# Patient Record
Sex: FEMALE | Race: WHITE
[De-identification: names, ages, dates, MRNs, and addresses within clinical notes are randomized per-mention and may not be internally consistent; named-entity substitution may affect disease eponyms.]

---

## 2017-05-12 PROBLEM — Z00.00 ENCOUNTER FOR PREVENTIVE HEALTH EXAMINATION: Status: ACTIVE | Noted: 2017-05-12

## 2017-05-30 ENCOUNTER — RECORD ABSTRACTING (OUTPATIENT)
Age: 77
End: 2017-05-30

## 2017-05-30 ENCOUNTER — RESULT CHARGE (OUTPATIENT)
Age: 77
End: 2017-05-30

## 2017-05-30 DIAGNOSIS — H40.9 UNSPECIFIED GLAUCOMA: ICD-10-CM

## 2017-05-30 DIAGNOSIS — H35.30 UNSPECIFIED MACULAR DEGENERATION: ICD-10-CM

## 2017-05-30 DIAGNOSIS — H26.9 UNSPECIFIED CATARACT: ICD-10-CM

## 2017-05-31 ENCOUNTER — NON-APPOINTMENT (OUTPATIENT)
Age: 77
End: 2017-05-31

## 2017-05-31 ENCOUNTER — OTHER (OUTPATIENT)
Age: 77
End: 2017-05-31

## 2017-05-31 ENCOUNTER — APPOINTMENT (OUTPATIENT)
Dept: INTERNAL MEDICINE | Facility: CLINIC | Age: 77
End: 2017-05-31
Payer: MEDICARE

## 2017-05-31 VITALS
TEMPERATURE: 98.1 F | WEIGHT: 154 LBS | BODY MASS INDEX: 24.75 KG/M2 | HEIGHT: 66 IN | HEART RATE: 84 BPM | OXYGEN SATURATION: 96 % | RESPIRATION RATE: 16 BRPM | SYSTOLIC BLOOD PRESSURE: 134 MMHG | DIASTOLIC BLOOD PRESSURE: 80 MMHG

## 2017-05-31 DIAGNOSIS — H40.2230 CHRONIC ANGLE-CLOSURE GLAUCOMA, BILATERAL, STAGE UNSPECIFIED: ICD-10-CM

## 2017-05-31 DIAGNOSIS — Z78.9 OTHER SPECIFIED HEALTH STATUS: ICD-10-CM

## 2017-05-31 DIAGNOSIS — H43.813 VITREOUS DEGENERATION, BILATERAL: ICD-10-CM

## 2017-05-31 DIAGNOSIS — M25.474 EFFUSION, RIGHT FOOT: ICD-10-CM

## 2017-05-31 DIAGNOSIS — R63.5 ABNORMAL WEIGHT GAIN: ICD-10-CM

## 2017-05-31 DIAGNOSIS — Z80.9 FAMILY HISTORY OF MALIGNANT NEOPLASM, UNSPECIFIED: ICD-10-CM

## 2017-05-31 DIAGNOSIS — Z84.1 FAMILY HISTORY OF DISORDERS OF KIDNEY AND URETER: ICD-10-CM

## 2017-05-31 DIAGNOSIS — Z81.8 FAMILY HISTORY OF OTHER MENTAL AND BEHAVIORAL DISORDERS: ICD-10-CM

## 2017-05-31 DIAGNOSIS — Z82.49 FAMILY HISTORY OF ISCHEMIC HEART DISEASE AND OTHER DISEASES OF THE CIRCULATORY SYSTEM: ICD-10-CM

## 2017-05-31 DIAGNOSIS — E78.5 HYPERLIPIDEMIA, UNSPECIFIED: ICD-10-CM

## 2017-05-31 PROCEDURE — 94060 EVALUATION OF WHEEZING: CPT

## 2017-05-31 PROCEDURE — 94727 GAS DIL/WSHOT DETER LNG VOL: CPT

## 2017-05-31 PROCEDURE — 99215 OFFICE O/P EST HI 40 MIN: CPT | Mod: 25

## 2017-05-31 PROCEDURE — 93000 ELECTROCARDIOGRAM COMPLETE: CPT

## 2017-05-31 PROCEDURE — 94729 DIFFUSING CAPACITY: CPT

## 2017-06-23 LAB
CHOLEST SERPL-MCNC: 169
HDLC SERPL-MCNC: 74
LDLC SERPL CALC-MCNC: 80

## 2017-07-22 ENCOUNTER — EMERGENCY (EMERGENCY)
Facility: HOSPITAL | Age: 77
LOS: 0 days | Discharge: HOME | End: 2017-07-22
Attending: EMERGENCY MEDICINE | Admitting: EMERGENCY MEDICINE
Payer: MEDICARE

## 2017-07-22 VITALS
OXYGEN SATURATION: 100 % | TEMPERATURE: 98 F | HEIGHT: 67 IN | DIASTOLIC BLOOD PRESSURE: 93 MMHG | RESPIRATION RATE: 18 BRPM | SYSTOLIC BLOOD PRESSURE: 168 MMHG | WEIGHT: 160.06 LBS | HEART RATE: 73 BPM
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
## 2017-07-22 DIAGNOSIS — S90.02XA CONTUSION OF LEFT ANKLE, INITIAL ENCOUNTER: ICD-10-CM

## 2017-07-22 PROCEDURE — 29505 APPLICATION LONG LEG SPLINT: CPT

## 2017-07-22 PROCEDURE — 73590 X-RAY EXAM OF LOWER LEG: CPT | Mod: 26,LT

## 2017-07-22 PROCEDURE — 99284 EMERGENCY DEPT VISIT MOD MDM: CPT | Mod: 25

## 2017-07-22 PROCEDURE — 73610 X-RAY EXAM OF ANKLE: CPT | Mod: 26,RT

## 2017-07-22 PROCEDURE — 73630 X-RAY EXAM OF FOOT: CPT | Mod: 26,LT

## 2017-07-22 RX ORDER — IBUPROFEN 200 MG
600 TABLET ORAL ONCE
Qty: 0 | Refills: 0 | Status: COMPLETED | OUTPATIENT
Start: 2017-07-22 | End: 2017-07-22

## 2017-07-22 RX ADMIN — Medication 600 MILLIGRAM(S): at 17:08

## 2017-07-22 NOTE — ED PROVIDER NOTE - MUSCULOSKELETAL, MLM
Spine appears normal. LLE: +mild left foot swelling and ecchymosis over the dorsal aspect over digits 2-4. +Tenderness to posterior aspect of left lateral malleolus. Limited ROM secondary to pain. Pulses and sensation intact. Spine appears normal. LLE: +mild left foot swelling and ecchymosis over the proximal dorsal aspect over digits 2-4. +Tenderness to posterior aspect of left lateral malleolus. Limited ROM secondary to pain. Pulses and sensation intact.

## 2017-07-22 NOTE — ED PROVIDER NOTE - OBJECTIVE STATEMENT
77 y/o female with no PMHx presents to the ED BIBEMS s/p fall PTA. Pt was carrying packages down the stairs, slipped on a step, her left knee bent and fell over 3 steps onto her left ankle. Pt was ambulatory right after. No LOC. Did not hit her head. C/o left ankle pain and swelling. Denies lightheadedness, dizziness, CP, or SOB prior to event. No daily meds. Did not take pain meds PTA. Notes chronic mild pedal edema, is seeing a doctor outpatient and wears compression stocking. Otherwise no other complaints.

## 2017-07-22 NOTE — ED PROCEDURE NOTE - CPROC ED POST PROC CARE GUIDE1
Instructed patient/caregiver regarding signs and symptoms of infection./Elevate the injured extremity as instructed./Keep the cast/splint/dressing clean and dry./Instructed patient/caregiver to follow-up with primary care physician./Verbal/written post procedure instructions were given to patient/caregiver.

## 2017-07-22 NOTE — ED PROVIDER NOTE - MEDICAL DECISION MAKING DETAILS
XR left foot, ankle and lower leg. Reassess. XR left foot, ankle and lower leg. Reassess.  XRs show no obvious fracture. Placed in posterior mold splint, ortho f/u

## 2017-07-22 NOTE — ED PROVIDER NOTE - PROGRESS NOTE DETAILS
No obvious fracture on XRays. Will splint in posterior mold and provide crutches for possible midfoot fracture, pt instructed to f/u with orthopedics or her own podiatrist

## 2017-07-22 NOTE — ED ADULT NURSE NOTE - OBJECTIVE STATEMENT
s/p fall down 3 steps, twisting left foot/ankle, denies syncope/dizziness. Left foot pain worse with movement and flexion, denies knee or hip pain. Notable bruising to top of left foot.

## 2017-07-25 ENCOUNTER — OUTPATIENT (OUTPATIENT)
Dept: OUTPATIENT SERVICES | Facility: HOSPITAL | Age: 77
LOS: 1 days | End: 2017-07-25
Payer: MEDICARE

## 2017-07-25 ENCOUNTER — APPOINTMENT (OUTPATIENT)
Dept: CT IMAGING | Facility: CLINIC | Age: 77
End: 2017-07-25

## 2017-07-25 DIAGNOSIS — Z00.8 ENCOUNTER FOR OTHER GENERAL EXAMINATION: ICD-10-CM

## 2017-07-25 PROCEDURE — 76377 3D RENDER W/INTRP POSTPROCES: CPT

## 2017-07-25 PROCEDURE — 73700 CT LOWER EXTREMITY W/O DYE: CPT

## 2018-01-08 ENCOUNTER — RECORD ABSTRACTING (OUTPATIENT)
Age: 78
End: 2018-01-08

## 2018-01-08 RX ORDER — UBIDECARENONE 200 MG
CAPSULE ORAL
Refills: 0 | Status: DISCONTINUED | COMMUNITY
End: 2018-01-08

## 2018-05-02 ENCOUNTER — APPOINTMENT (OUTPATIENT)
Dept: INTERNAL MEDICINE | Facility: CLINIC | Age: 78
End: 2018-05-02

## 2018-05-02 DIAGNOSIS — R00.2 PALPITATIONS: ICD-10-CM

## 2018-05-02 DIAGNOSIS — M79.671 PAIN IN RIGHT FOOT: ICD-10-CM

## 2018-05-02 RX ORDER — CALCIUM CARBONATE/VITAMIN D3 600 MG-10
TABLET ORAL
Refills: 0 | Status: ACTIVE | COMMUNITY

## 2018-05-02 RX ORDER — TIMOLOL MALEATE 5 MG/ML
0.5 SOLUTION OPHTHALMIC
Qty: 15 | Refills: 0 | Status: ACTIVE | COMMUNITY
Start: 2018-02-10

## 2018-05-02 RX ORDER — UBIDECARENONE 200 MG
200 CAPSULE ORAL
Refills: 0 | Status: ACTIVE | COMMUNITY

## 2018-05-02 RX ORDER — CLINDAMYCIN HYDROCHLORIDE 300 MG/1
300 CAPSULE ORAL
Qty: 40 | Refills: 0 | Status: COMPLETED | COMMUNITY
Start: 2018-04-19

## 2018-05-02 RX ORDER — LATANOPROST/PF 0.005 %
0.01 DROPS OPHTHALMIC (EYE)
Qty: 8 | Refills: 0 | Status: ACTIVE | COMMUNITY
Start: 2017-03-19

## 2018-05-02 RX ORDER — MULTIVITAMIN
TABLET ORAL
Refills: 0 | Status: ACTIVE | COMMUNITY

## 2018-05-04 ENCOUNTER — APPOINTMENT (OUTPATIENT)
Dept: INTERNAL MEDICINE | Facility: CLINIC | Age: 78
End: 2018-05-04

## 2018-05-04 DIAGNOSIS — J30.2 OTHER ALLERGIC RHINITIS: ICD-10-CM

## 2018-05-04 DIAGNOSIS — J45.998 OTHER ASTHMA: ICD-10-CM

## 2018-05-04 DIAGNOSIS — I10 ESSENTIAL (PRIMARY) HYPERTENSION: ICD-10-CM

## 2018-05-04 DIAGNOSIS — J30.89 OTHER ALLERGIC RHINITIS: ICD-10-CM

## 2018-05-04 DIAGNOSIS — Z91.19 PATIENT'S NONCOMPLIANCE WITH OTHER MEDICAL TREATMENT AND REGIMEN: ICD-10-CM

## 2018-05-04 RX ORDER — BRIMONIDINE TARTRATE 0.2 %
DROPS OPHTHALMIC (EYE)
Refills: 0 | Status: ACTIVE | COMMUNITY

## 2018-05-10 ENCOUNTER — APPOINTMENT (OUTPATIENT)
Dept: INTERNAL MEDICINE | Facility: CLINIC | Age: 78
End: 2018-05-10
Payer: MEDICARE

## 2018-05-10 VITALS
DIASTOLIC BLOOD PRESSURE: 80 MMHG | WEIGHT: 144 LBS | SYSTOLIC BLOOD PRESSURE: 140 MMHG | RESPIRATION RATE: 16 BRPM | TEMPERATURE: 98.6 F | BODY MASS INDEX: 23.14 KG/M2 | HEIGHT: 66 IN | HEART RATE: 72 BPM | OXYGEN SATURATION: 98 %

## 2018-05-10 DIAGNOSIS — Z86.69 PERSONAL HISTORY OF OTHER DISEASES OF THE NERVOUS SYSTEM AND SENSE ORGANS: ICD-10-CM

## 2018-05-10 PROCEDURE — 99213 OFFICE O/P EST LOW 20 MIN: CPT

## 2018-05-10 RX ORDER — VITAMIN K2 90 MCG
400 CAPSULE ORAL
Refills: 0 | Status: DISCONTINUED | COMMUNITY
End: 2018-05-10

## 2018-05-10 RX ORDER — CHOLECALCIFEROL (VITAMIN D3) 50 MCG
50 MCG TABLET ORAL
Refills: 0 | Status: DISCONTINUED | COMMUNITY
End: 2018-05-10

## 2018-05-14 ENCOUNTER — APPOINTMENT (OUTPATIENT)
Dept: INTERNAL MEDICINE | Facility: CLINIC | Age: 78
End: 2018-05-14
Payer: MEDICARE

## 2018-05-14 VITALS
HEIGHT: 66 IN | WEIGHT: 141 LBS | RESPIRATION RATE: 20 BRPM | SYSTOLIC BLOOD PRESSURE: 124 MMHG | HEART RATE: 68 BPM | TEMPERATURE: 97.4 F | BODY MASS INDEX: 22.66 KG/M2 | OXYGEN SATURATION: 98 % | DIASTOLIC BLOOD PRESSURE: 88 MMHG

## 2018-05-14 DIAGNOSIS — R41.3 OTHER AMNESIA: ICD-10-CM

## 2018-05-14 DIAGNOSIS — R19.7 DIARRHEA, UNSPECIFIED: ICD-10-CM

## 2018-05-14 PROCEDURE — 99213 OFFICE O/P EST LOW 20 MIN: CPT

## 2018-10-22 NOTE — ED ADULT NURSE NOTE - ALCOHOL PRE SCREEN (AUDIT - C)
EXAM: AP and lateral views of the right knee

 

DATE: 10/22/2018 9:28 AM

 

INDICATION: POST OP RIGHT TOTAL KNEE REPLACEMENT

 

COMPARISON: No Prior

 

FINDINGS:

Postoperative changes of right total knee arthroplasty are now seen, in 

good alignment without definite hardware complication. Expected 

postoperative soft tissue changes are seen. No evidence of acute fracture 

or dislocation.

 

IMPRESSION:

1.  Postoperative changes of right total knee arthroplasty, in good 

alignment without definite hardware complication or fracture.

 

Electronically signed by: Humphrey Donovan MD (10/22/2018 10:07 AM) [facility]
Statement Selected

## 2019-04-18 ENCOUNTER — OUTPATIENT (OUTPATIENT)
Dept: OUTPATIENT SERVICES | Facility: HOSPITAL | Age: 79
LOS: 1 days | End: 2019-04-18
Payer: MEDICARE

## 2019-04-18 ENCOUNTER — APPOINTMENT (OUTPATIENT)
Dept: MRI IMAGING | Facility: CLINIC | Age: 79
End: 2019-04-18
Payer: MEDICARE

## 2019-04-18 DIAGNOSIS — Z00.8 ENCOUNTER FOR OTHER GENERAL EXAMINATION: ICD-10-CM

## 2019-04-18 PROCEDURE — 70551 MRI BRAIN STEM W/O DYE: CPT | Mod: 26

## 2019-04-18 PROCEDURE — 70551 MRI BRAIN STEM W/O DYE: CPT

## 2019-06-06 ENCOUNTER — APPOINTMENT (OUTPATIENT)
Dept: INTERNAL MEDICINE | Facility: CLINIC | Age: 79
End: 2019-06-06

## 2020-03-01 ENCOUNTER — EMERGENCY (EMERGENCY)
Facility: HOSPITAL | Age: 80
LOS: 0 days | Discharge: HOME | End: 2020-03-01
Attending: EMERGENCY MEDICINE
Payer: MEDICARE

## 2020-03-01 VITALS
TEMPERATURE: 98 F | HEART RATE: 61 BPM | WEIGHT: 119.93 LBS | HEIGHT: 66 IN | SYSTOLIC BLOOD PRESSURE: 167 MMHG | RESPIRATION RATE: 17 BRPM | OXYGEN SATURATION: 100 % | DIASTOLIC BLOOD PRESSURE: 80 MMHG

## 2020-03-01 VITALS — DIASTOLIC BLOOD PRESSURE: 82 MMHG | HEART RATE: 60 BPM | SYSTOLIC BLOOD PRESSURE: 161 MMHG

## 2020-03-01 DIAGNOSIS — F03.90 UNSPECIFIED DEMENTIA WITHOUT BEHAVIORAL DISTURBANCE: ICD-10-CM

## 2020-03-01 DIAGNOSIS — R42 DIZZINESS AND GIDDINESS: ICD-10-CM

## 2020-03-01 LAB
ALBUMIN SERPL ELPH-MCNC: 3.4 G/DL — SIGNIFICANT CHANGE UP (ref 3.3–5)
ALP SERPL-CCNC: 98 U/L — SIGNIFICANT CHANGE UP (ref 40–120)
ALT FLD-CCNC: 19 U/L — SIGNIFICANT CHANGE UP (ref 12–78)
ANION GAP SERPL CALC-SCNC: 4 MMOL/L — LOW (ref 5–17)
AST SERPL-CCNC: 23 U/L — SIGNIFICANT CHANGE UP (ref 15–37)
BASOPHILS # BLD AUTO: 0.21 K/UL — HIGH (ref 0–0.2)
BASOPHILS NFR BLD AUTO: 2.5 % — HIGH (ref 0–2)
BILIRUB SERPL-MCNC: 0.7 MG/DL — SIGNIFICANT CHANGE UP (ref 0.2–1.2)
BUN SERPL-MCNC: 15 MG/DL — SIGNIFICANT CHANGE UP (ref 7–23)
CALCIUM SERPL-MCNC: 8.8 MG/DL — SIGNIFICANT CHANGE UP (ref 8.5–10.1)
CHLORIDE SERPL-SCNC: 110 MMOL/L — HIGH (ref 96–108)
CO2 SERPL-SCNC: 28 MMOL/L — SIGNIFICANT CHANGE UP (ref 22–31)
CREAT SERPL-MCNC: 0.83 MG/DL — SIGNIFICANT CHANGE UP (ref 0.5–1.3)
EOSINOPHIL # BLD AUTO: 0.45 K/UL — SIGNIFICANT CHANGE UP (ref 0–0.5)
EOSINOPHIL NFR BLD AUTO: 5.3 % — SIGNIFICANT CHANGE UP (ref 0–6)
GLUCOSE SERPL-MCNC: 89 MG/DL — SIGNIFICANT CHANGE UP (ref 70–99)
HCT VFR BLD CALC: 41.5 % — SIGNIFICANT CHANGE UP (ref 34.5–45)
HGB BLD-MCNC: 13.5 G/DL — SIGNIFICANT CHANGE UP (ref 11.5–15.5)
IMM GRANULOCYTES NFR BLD AUTO: 0.1 % — SIGNIFICANT CHANGE UP (ref 0–1.5)
LYMPHOCYTES # BLD AUTO: 1.96 K/UL — SIGNIFICANT CHANGE UP (ref 1–3.3)
LYMPHOCYTES # BLD AUTO: 23.2 % — SIGNIFICANT CHANGE UP (ref 13–44)
MCHC RBC-ENTMCNC: 31.4 PG — SIGNIFICANT CHANGE UP (ref 27–34)
MCHC RBC-ENTMCNC: 32.5 GM/DL — SIGNIFICANT CHANGE UP (ref 32–36)
MCV RBC AUTO: 96.5 FL — SIGNIFICANT CHANGE UP (ref 80–100)
MONOCYTES # BLD AUTO: 0.86 K/UL — SIGNIFICANT CHANGE UP (ref 0–0.9)
MONOCYTES NFR BLD AUTO: 10.2 % — SIGNIFICANT CHANGE UP (ref 2–14)
NEUTROPHILS # BLD AUTO: 4.97 K/UL — SIGNIFICANT CHANGE UP (ref 1.8–7.4)
NEUTROPHILS NFR BLD AUTO: 58.7 % — SIGNIFICANT CHANGE UP (ref 43–77)
PLATELET # BLD AUTO: 259 K/UL — SIGNIFICANT CHANGE UP (ref 150–400)
POTASSIUM SERPL-MCNC: 3.8 MMOL/L — SIGNIFICANT CHANGE UP (ref 3.5–5.3)
POTASSIUM SERPL-SCNC: 3.8 MMOL/L — SIGNIFICANT CHANGE UP (ref 3.5–5.3)
PROT SERPL-MCNC: 7 GM/DL — SIGNIFICANT CHANGE UP (ref 6–8.3)
RBC # BLD: 4.3 M/UL — SIGNIFICANT CHANGE UP (ref 3.8–5.2)
RBC # FLD: 12.7 % — SIGNIFICANT CHANGE UP (ref 10.3–14.5)
SODIUM SERPL-SCNC: 142 MMOL/L — SIGNIFICANT CHANGE UP (ref 135–145)
WBC # BLD: 8.46 K/UL — SIGNIFICANT CHANGE UP (ref 3.8–10.5)
WBC # FLD AUTO: 8.46 K/UL — SIGNIFICANT CHANGE UP (ref 3.8–10.5)

## 2020-03-01 PROCEDURE — 99284 EMERGENCY DEPT VISIT MOD MDM: CPT

## 2020-03-01 PROCEDURE — 36415 COLL VENOUS BLD VENIPUNCTURE: CPT

## 2020-03-01 PROCEDURE — 80053 COMPREHEN METABOLIC PANEL: CPT

## 2020-03-01 PROCEDURE — 85025 COMPLETE CBC W/AUTO DIFF WBC: CPT

## 2020-03-01 PROCEDURE — 93010 ELECTROCARDIOGRAM REPORT: CPT

## 2020-03-01 PROCEDURE — 99284 EMERGENCY DEPT VISIT MOD MDM: CPT | Mod: 25

## 2020-03-01 PROCEDURE — 70450 CT HEAD/BRAIN W/O DYE: CPT | Mod: 26

## 2020-03-01 PROCEDURE — 93005 ELECTROCARDIOGRAM TRACING: CPT

## 2020-03-01 PROCEDURE — 70450 CT HEAD/BRAIN W/O DYE: CPT

## 2020-03-01 RX ORDER — SODIUM CHLORIDE 9 MG/ML
1000 INJECTION INTRAMUSCULAR; INTRAVENOUS; SUBCUTANEOUS ONCE
Refills: 0 | Status: COMPLETED | OUTPATIENT
Start: 2020-03-01 | End: 2020-03-01

## 2020-03-01 RX ADMIN — SODIUM CHLORIDE 1000 MILLILITER(S): 9 INJECTION INTRAMUSCULAR; INTRAVENOUS; SUBCUTANEOUS at 11:10

## 2020-03-01 NOTE — ED STATDOCS - ENMT, MLM
Nasal mucosa clear.  Mouth with normal mucosa  Throat has no vesicles,  and uvula is midline. Oropharynx is dry

## 2020-03-01 NOTE — ED STATDOCS - PROGRESS NOTE DETAILS
80 yo female with a PMH of dementia, not on any meds per aid, presents with a 78 yo female with a PMH of dementia, not on any meds per aid, presents with a feelign of dizziness that occurred today. Pt was walking and felt like she was going to pass out. Per aid, this was the 2nd episode that happened. EMS was called and checked her BP which was 170/100 and advised to go to the ER. Pt currently not dizzy. Denies cp, sob, abd pain, n/v/d. Pt states she is eating an drinking normally. CT head due to this dizziness episode, labs, IVF. Reeval. -Dennys Quigley PA-C 80 yo female with a PMH of dementia, not on any meds per aid, presents with a feeling of dizziness that occurred today. Pt was walking and felt like she was going to pass out. Per aid, this was the 2nd episode that happened. EMS was called and checked her BP which was 170/100 and advised to go to the ER. Pt currently not dizzy. Denies cp, sob, abd pain, n/v/d. Pt states she is eating an drinking normally. CT head due to this dizziness episode, labs, IVF. Reeval. -Dennys Quigley PA-C Pt feeling better. Discussed with pt and aid the results of the unremarkable imaging and lab tests. Pt wants to go home. Advised pt to f/u with pmd and will give neuro f/u -Dennys Quigley PA-C

## 2020-03-01 NOTE — ED STATDOCS - CLINICAL SUMMARY MEDICAL DECISION MAKING FREE TEXT BOX
Elderly female with dementia presents with near syncopal episode. Plan: Will rehydrate and EKG. CT scan of brain as pt is poor historian.

## 2020-03-01 NOTE — ED STATDOCS - CARE PROVIDER_API CALL
Patsy Cool (MD)  Clinical Neurophysiology; EEGEpilepsy; Neurology; Sleep Medicine  [address] [facility] [address], [place] [address]  Phone: [phone]  Fax: [phone]  Follow Up Time:

## 2020-03-01 NOTE — ED STATDOCS - ATTENDING CONTRIBUTION TO CARE
I, Merly Lopez MD, personally saw the patient with ACP.  I have personally performed a face to face diagnostic evaluation on this patient.  I have reviewed the ACP note and agree with the history, exam, and plan of care, except as noted.

## 2020-03-01 NOTE — ED ADULT NURSE NOTE - OBJECTIVE STATEMENT
Pt arrived with her aid.pt is confused  h/o dementia according to aid pt c/o dizziness at home. pt is able to walk on her own. no c/o pain. pt live at home alone with 4 hr with aid a day. no information about pmd or medication. Pt arrived with her aid.pt is confused  h/o dementia according to aid pt c/o dizziness at home. pt is able to walk on her own. no c/o pain. pt live at home alone with 4 hr with aid a day. no information about pmd , medication or health care proxy.

## 2020-03-08 ENCOUNTER — EMERGENCY (EMERGENCY)
Facility: HOSPITAL | Age: 80
LOS: 0 days | Discharge: HOME | End: 2020-03-09
Attending: FAMILY MEDICINE
Payer: MEDICARE

## 2020-03-08 VITALS
HEART RATE: 66 BPM | TEMPERATURE: 98 F | WEIGHT: 119.93 LBS | DIASTOLIC BLOOD PRESSURE: 86 MMHG | SYSTOLIC BLOOD PRESSURE: 147 MMHG | HEIGHT: 66 IN | OXYGEN SATURATION: 100 % | RESPIRATION RATE: 16 BRPM

## 2020-03-08 DIAGNOSIS — R19.7 DIARRHEA, UNSPECIFIED: ICD-10-CM

## 2020-03-08 DIAGNOSIS — R10.9 UNSPECIFIED ABDOMINAL PAIN: ICD-10-CM

## 2020-03-08 DIAGNOSIS — R11.2 NAUSEA WITH VOMITING, UNSPECIFIED: ICD-10-CM

## 2020-03-08 DIAGNOSIS — F03.90 UNSPECIFIED DEMENTIA WITHOUT BEHAVIORAL DISTURBANCE: ICD-10-CM

## 2020-03-08 LAB
ALBUMIN SERPL ELPH-MCNC: 3.4 G/DL — SIGNIFICANT CHANGE UP (ref 3.3–5)
ALP SERPL-CCNC: 99 U/L — SIGNIFICANT CHANGE UP (ref 40–120)
ALT FLD-CCNC: 23 U/L — SIGNIFICANT CHANGE UP (ref 12–78)
AMMONIA BLD-MCNC: <10 UMOL/L — LOW (ref 11–32)
ANION GAP SERPL CALC-SCNC: 7 MMOL/L — SIGNIFICANT CHANGE UP (ref 5–17)
APPEARANCE UR: CLEAR — SIGNIFICANT CHANGE UP
AST SERPL-CCNC: 38 U/L — HIGH (ref 15–37)
BASOPHILS # BLD AUTO: 0.07 K/UL — SIGNIFICANT CHANGE UP (ref 0–0.2)
BASOPHILS NFR BLD AUTO: 0.5 % — SIGNIFICANT CHANGE UP (ref 0–2)
BILIRUB SERPL-MCNC: 0.9 MG/DL — SIGNIFICANT CHANGE UP (ref 0.2–1.2)
BILIRUB UR-MCNC: NEGATIVE — SIGNIFICANT CHANGE UP
BUN SERPL-MCNC: 13 MG/DL — SIGNIFICANT CHANGE UP (ref 7–23)
CALCIUM SERPL-MCNC: 8.8 MG/DL — SIGNIFICANT CHANGE UP (ref 8.5–10.1)
CHLORIDE SERPL-SCNC: 114 MMOL/L — HIGH (ref 96–108)
CO2 SERPL-SCNC: 23 MMOL/L — SIGNIFICANT CHANGE UP (ref 22–31)
COLOR SPEC: YELLOW — SIGNIFICANT CHANGE UP
CREAT SERPL-MCNC: 0.9 MG/DL — SIGNIFICANT CHANGE UP (ref 0.5–1.3)
DIFF PNL FLD: ABNORMAL
EOSINOPHIL # BLD AUTO: 0.05 K/UL — SIGNIFICANT CHANGE UP (ref 0–0.5)
EOSINOPHIL NFR BLD AUTO: 0.4 % — SIGNIFICANT CHANGE UP (ref 0–6)
GLUCOSE SERPL-MCNC: 98 MG/DL — SIGNIFICANT CHANGE UP (ref 70–99)
GLUCOSE UR QL: NEGATIVE MG/DL — SIGNIFICANT CHANGE UP
HCT VFR BLD CALC: 40.4 % — SIGNIFICANT CHANGE UP (ref 34.5–45)
HGB BLD-MCNC: 13.3 G/DL — SIGNIFICANT CHANGE UP (ref 11.5–15.5)
IMM GRANULOCYTES NFR BLD AUTO: 0.4 % — SIGNIFICANT CHANGE UP (ref 0–1.5)
KETONES UR-MCNC: ABNORMAL
LEUKOCYTE ESTERASE UR-ACNC: NEGATIVE — SIGNIFICANT CHANGE UP
LIDOCAIN IGE QN: 100 U/L — SIGNIFICANT CHANGE UP (ref 73–393)
LYMPHOCYTES # BLD AUTO: 0.44 K/UL — LOW (ref 1–3.3)
LYMPHOCYTES # BLD AUTO: 3.3 % — LOW (ref 13–44)
MAGNESIUM SERPL-MCNC: 2 MG/DL — SIGNIFICANT CHANGE UP (ref 1.6–2.6)
MCHC RBC-ENTMCNC: 31.4 PG — SIGNIFICANT CHANGE UP (ref 27–34)
MCHC RBC-ENTMCNC: 32.9 GM/DL — SIGNIFICANT CHANGE UP (ref 32–36)
MCV RBC AUTO: 95.5 FL — SIGNIFICANT CHANGE UP (ref 80–100)
MONOCYTES # BLD AUTO: 0.68 K/UL — SIGNIFICANT CHANGE UP (ref 0–0.9)
MONOCYTES NFR BLD AUTO: 5.2 % — SIGNIFICANT CHANGE UP (ref 2–14)
NEUTROPHILS # BLD AUTO: 11.86 K/UL — HIGH (ref 1.8–7.4)
NEUTROPHILS NFR BLD AUTO: 90.2 % — HIGH (ref 43–77)
NITRITE UR-MCNC: NEGATIVE — SIGNIFICANT CHANGE UP
NT-PROBNP SERPL-SCNC: 304 PG/ML — SIGNIFICANT CHANGE UP (ref 0–450)
PH UR: 5 — SIGNIFICANT CHANGE UP (ref 5–8)
PLATELET # BLD AUTO: 219 K/UL — SIGNIFICANT CHANGE UP (ref 150–400)
POTASSIUM SERPL-MCNC: 4.1 MMOL/L — SIGNIFICANT CHANGE UP (ref 3.5–5.3)
POTASSIUM SERPL-SCNC: 4.1 MMOL/L — SIGNIFICANT CHANGE UP (ref 3.5–5.3)
PROT SERPL-MCNC: 6.8 GM/DL — SIGNIFICANT CHANGE UP (ref 6–8.3)
PROT UR-MCNC: 15 MG/DL
RBC # BLD: 4.23 M/UL — SIGNIFICANT CHANGE UP (ref 3.8–5.2)
RBC # FLD: 12.7 % — SIGNIFICANT CHANGE UP (ref 10.3–14.5)
SODIUM SERPL-SCNC: 144 MMOL/L — SIGNIFICANT CHANGE UP (ref 135–145)
SP GR SPEC: 1.01 — SIGNIFICANT CHANGE UP (ref 1.01–1.02)
TROPONIN I SERPL-MCNC: <0.015 NG/ML — SIGNIFICANT CHANGE UP (ref 0.01–0.04)
UROBILINOGEN FLD QL: NEGATIVE MG/DL — SIGNIFICANT CHANGE UP
WBC # BLD: 13.15 K/UL — HIGH (ref 3.8–10.5)
WBC # FLD AUTO: 13.15 K/UL — HIGH (ref 3.8–10.5)

## 2020-03-08 PROCEDURE — 74178 CT ABD&PLV WO CNTR FLWD CNTR: CPT

## 2020-03-08 PROCEDURE — 83735 ASSAY OF MAGNESIUM: CPT

## 2020-03-08 PROCEDURE — 87086 URINE CULTURE/COLONY COUNT: CPT

## 2020-03-08 PROCEDURE — 93005 ELECTROCARDIOGRAM TRACING: CPT

## 2020-03-08 PROCEDURE — 74177 CT ABD & PELVIS W/CONTRAST: CPT

## 2020-03-08 PROCEDURE — 36415 COLL VENOUS BLD VENIPUNCTURE: CPT

## 2020-03-08 PROCEDURE — 74176 CT ABD & PELVIS W/O CONTRAST: CPT

## 2020-03-08 PROCEDURE — 85025 COMPLETE CBC W/AUTO DIFF WBC: CPT

## 2020-03-08 PROCEDURE — 74176 CT ABD & PELVIS W/O CONTRAST: CPT | Mod: 26,59

## 2020-03-08 PROCEDURE — 99285 EMERGENCY DEPT VISIT HI MDM: CPT | Mod: 25

## 2020-03-08 PROCEDURE — 84484 ASSAY OF TROPONIN QUANT: CPT

## 2020-03-08 PROCEDURE — 82140 ASSAY OF AMMONIA: CPT

## 2020-03-08 PROCEDURE — 83690 ASSAY OF LIPASE: CPT

## 2020-03-08 PROCEDURE — 71045 X-RAY EXAM CHEST 1 VIEW: CPT

## 2020-03-08 PROCEDURE — 81001 URINALYSIS AUTO W/SCOPE: CPT

## 2020-03-08 PROCEDURE — 99285 EMERGENCY DEPT VISIT HI MDM: CPT

## 2020-03-08 PROCEDURE — 74177 CT ABD & PELVIS W/CONTRAST: CPT | Mod: 26

## 2020-03-08 PROCEDURE — 93010 ELECTROCARDIOGRAM REPORT: CPT

## 2020-03-08 PROCEDURE — 80053 COMPREHEN METABOLIC PANEL: CPT

## 2020-03-08 PROCEDURE — 83880 ASSAY OF NATRIURETIC PEPTIDE: CPT

## 2020-03-08 PROCEDURE — 71045 X-RAY EXAM CHEST 1 VIEW: CPT | Mod: 26

## 2020-03-08 RX ORDER — SODIUM CHLORIDE 9 MG/ML
1000 INJECTION INTRAMUSCULAR; INTRAVENOUS; SUBCUTANEOUS ONCE
Refills: 0 | Status: COMPLETED | OUTPATIENT
Start: 2020-03-08 | End: 2020-03-08

## 2020-03-08 RX ADMIN — SODIUM CHLORIDE 1000 MILLILITER(S): 9 INJECTION INTRAMUSCULAR; INTRAVENOUS; SUBCUTANEOUS at 15:04

## 2020-03-08 NOTE — ED PROVIDER NOTE - PROGRESS NOTE DETAILS
Niyah FRIEND for ED attending, Dr. Dangelo : 79 YOF w/ PMHx of dementia presents to the ED BIBA c/o abd pain w/ N/V onset yesterday. Experienced multiple episodes of N/V. Describes emesis as "gold and white" in color. Vomiting for 2 days. Decreased PO intake and lower abd cramping. Denies SOB, CP, diarrhea, fever, chills, urinary sx. Pt is a poor historian due to pt's dementia at baseline. D/w Dr. Guerrero, will repeat CT with PO contrast. - Aleksandr Schreiber PA-C Patient tolerating PO, UA pending. - Aleksandr Schreiber PA-C UA unremarkable, plan for dc. - Aleksandr Schreiber PA-C Unable to reach emergency contact. Patient does not have safe disposition home. Will keep in ED for SW assessment in AM. - Aleksandr Schreiber PA-C Giovany by S/W -- aide will be at her home at 11am.  Will d/c home then

## 2020-03-08 NOTE — CONSULT NOTE ADULT - SUBJECTIVE AND OBJECTIVE BOX
CC:Patient is a 79y old  Female who presents with a chief complaint of     Subjective:  Pt seen and examined at bedside with chaperone. Pt is AAOx2, baseline dementia, pt is comfortable, cooperative, pt in no acute distress; pt is poor historian, history from chart and ED staff. Pt had episodes of emesis, diarrhea since 3/7/20. No reported c/o fever, chills, chest pain, SOB, extremity pain or dysfunction, hemoptysis, hematemesis, hematuria, hematochexia, headache, diplopia, vertigo, dizzyness.     ROS:  nausea, emesis, diarrhea, otherwise limited ROS secondary to dementia    PMH: dementia  PSH: Unknown  No Known Allergies    SH: no reported etoh, tobacco, illicit drug use  FH: unknown    Vital Signs Last 24 Hrs  T(C): 36.7 (08 Mar 2020 13:08), Max: 36.7 (08 Mar 2020 13:08)  T(F): 98.1 (08 Mar 2020 13:08), Max: 98.1 (08 Mar 2020 13:08)  HR: 72 (08 Mar 2020 13:39) (66 - 72)  BP: 131/78 (08 Mar 2020 13:39) (131/78 - 147/86)  BP(mean): --  RR: 17 (08 Mar 2020 13:39) (16 - 17)  SpO2: 100% (08 Mar 2020 13:39) (100% - 100%)    Labs:      CARDIAC MARKERS ( 08 Mar 2020 13:35 )  <0.015 ng/mL / x     / x     / x     / x                                13.3   13.15 )-----------( 219      ( 08 Mar 2020 13:35 )             40.4     CBC Full  -  ( 08 Mar 2020 13:35 )  WBC Count : 13.15 K/uL  RBC Count : 4.23 M/uL  Hemoglobin : 13.3 g/dL  Hematocrit : 40.4 %  Platelet Count - Automated : 219 K/uL  Mean Cell Volume : 95.5 fl  Mean Cell Hemoglobin : 31.4 pg  Mean Cell Hemoglobin Concentration : 32.9 gm/dL  Auto Neutrophil # : 11.86 K/uL  Auto Lymphocyte # : 0.44 K/uL  Auto Monocyte # : 0.68 K/uL  Auto Eosinophil # : 0.05 K/uL  Auto Basophil # : 0.07 K/uL  Auto Neutrophil % : 90.2 %  Auto Lymphocyte % : 3.3 %  Auto Monocyte % : 5.2 %  Auto Eosinophil % : 0.4 %  Auto Basophil % : 0.5 %    03-08    144  |  114<H>  |  13  ----------------------------<  98  4.1   |  23  |  0.90    Ca    8.8      08 Mar 2020 13:35  Mg     2.0     03-08    TPro  6.8  /  Alb  3.4  /  TBili  0.9  /  DBili  x   /  AST  38<H>  /  ALT  23  /  AlkPhos  99  03-08    LIVER FUNCTIONS - ( 08 Mar 2020 13:35 )  Alb: 3.4 g/dL / Pro: 6.8 gm/dL / ALK PHOS: 99 U/L / ALT: 23 U/L / AST: 38 U/L / GGT: x                 Meds:      Radiology:  Pending offical repeat ct abd/pelvis with oral contrast, preliminary evaluation demonstrates no bowel obstruction with contrast to transverse colon    < from: CT Abdomen and Pelvis w/ IV Cont (03.08.20 @ 15:04) >  EXAM:  CT ABDOMEN AND PELVIS IC                            PROCEDURE DATE:  03/08/2020          INTERPRETATION:  CLINICAL INFORMATION: Abdominal pain and vomiting    COMPARISON: None    PROCEDURE:   CT of the Abdomen and Pelvis was performed with intravenous contrast.   Intravenous contrast: 90 ml Omnipaque 350. 10 ml discarded.  Oral contrast: None.  Sagittal and coronal reformats were performed.    FINDINGS:    LOWER CHEST: Minimal scarring and to linear atelectatic changes in the left lung base. No pleural pericardial effusion.    LIVER: Within normal limits.  BILE DUCTS: Normal caliber.  GALLBLADDER: Within normal limits.  SPLEEN: Within normal limits.  PANCREAS: Within normal limits.  ADRENALS: Within normal limits.  KIDNEYS/URETERS: Subcentimeter, renal hypodensities, too small to characterize. No renal calculus or hydronephrosis.    BLADDER: Within normal limits.  REPRODUCTIVE ORGANS: Uterus and adnexa within normal limits.    BOWEL: There is slightly prominent, fluid-filled loops of small bowel with a suggestion of a zone of transition in the distal ileum the colon is fluid-filled and not distended.. Appendix is not visualized. No evidence of inflammation in the pericecal region.  PERITONEUM: No ascites. No free air in the peritoneal cavity  VESSELS: Within normal limits.  RETROPERITONEUM/LYMPH NODES: No lymphadenopathy.    ABDOMINAL WALL: Within normal limits.  BONES: No destructive lesion in the visualized skeleton. Grade 1 spondylolisthesis of L4 on L5. Mild degenerative changes of the spine.    IMPRESSION:     Slightly prominent, fluid-filled loops of small bowel with questionable zone of transition in the distal small bowel, likely representing mild ileus versus partial small bowel obstruction. Clinical evaluation and/or follow-up examination with oral contrast is recommended as clinically indicated                    SPYROS HARISIADIS   This document has been electronically signed. Mar  8 2020  3:48PM        < end of copied text >      Physical exam:  Pt is AAOx2 with known dementia  Pt in no acute distress  Neuro: CNII-XII grossly intact   Psych: normal affect  HEENT: Normocephalic, atraumatic, CANDIS, EOM wnl  Neck: No crepitus, no ecchymosis, no hematoma, to exam, no JVD, no tracheal deviation  Cardiovascular: S1S2 Present  Respiratory: Respiratory Effort normal; no wheezes, rales or rhonchi to exam, CTAB  ABD: bowel sounds (+), soft, nontender, non distended, no rebound, no guarding, no rigidity, no skin changes to exam. No pelvic instability to exam, no skin changes, negative ross's sign to exam  Musculoskeletal: All digits are warm and well perfused. Pt demonstrates grossly intact sensoromotor function. Pt has good capillary refill to digits, no calf edema or tenderness to exam.  Skin: no jaundice or icteric sclera to exam b/l, no skin changes to exam

## 2020-03-08 NOTE — ED PROVIDER NOTE - PATIENT PORTAL LINK FT
You can access the FollowMyHealth Patient Portal offered by [facility] by registering at the following website: [email or web address]. By joining [employer]’s FollowMyHealth portal, you will also be able to view your health information using other applications (apps) compatible with our system. You can access the FollowMyHealth Patient Portal offered by [facility] by registering at the following website: [email or web address]. By joining [employer]’s FollowMyHealth portal, you will also be able to view your health information using other applications (apps) compatible with our system.

## 2020-03-08 NOTE — ED ADULT NURSE NOTE - OBJECTIVE STATEMENT
Patient presents to ED complaining of abdominal pain. Patient complaining of NVD starting yesterday. Patient complaining of multiple episodes of vomiting and diarrhea, decreased po intake and lower abdominal cramping. Patient poor historian. As per EMS PMHx of dementia, patient lives at home with aide assistance. Patient ambulatory on arrival. Patient denies SOB, CP, fever, chills, urinary sx.

## 2020-03-08 NOTE — ED PROVIDER NOTE - OBJECTIVE STATEMENT
80 y/o F with PMH of dementia, denies other PMH presents with abdominal pain, nausea, vomiting, diarrhea since yesterday. Pain reported as diffuse. Did not try medications at home for symptoms. Denies fever, chills, dysuria, hematuria. ?history of appendectomy as a child. Pt is A&O x2. As per EMS, pt lives alone with home health aide. Pt is unaccompanied in room. Pt states she has no family local to [facility].

## 2020-03-08 NOTE — ED PROVIDER NOTE - ATTENDING CONTRIBUTION TO CARE
Pt eval, treatment and plan contemporaneously with MELVIN Schreiber. Agree with notes. Christiano HOLDEN

## 2020-03-08 NOTE — ED PROVIDER NOTE - SHIFT CHANGE DETAILS
signed out to Dr. Red. Pt is demented and lives alone with aide? Emergency contact is pt's doctor who is not on call. Will hold for sw to ensure safe d/c.

## 2020-03-08 NOTE — ED ADULT NURSE NOTE - NSIMPLEMENTINTERV_GEN_ALL_ED
Implemented All Fall Risk Interventions:  [place] to call system. Call bell, personal items and telephone within reach. Instruct patient to call for assistance. Room bathroom lighting operational. Non-slip footwear when patient is off stretcher. Physically safe environment: no spills, clutter or unnecessary equipment. Stretcher in lowest position, wheels locked, appropriate side rails in place. Provide visual cue, wrist band, yellow gown, etc. Monitor gait and stability. Monitor for mental status changes and reorient to person, place, and time. Review medications for side effects contributing to fall risk. Reinforce activity limits and safety measures with patient and family.

## 2020-03-08 NOTE — ED PROVIDER NOTE - PHYSICAL EXAMINATION
Gen: Well appearing. A&O x2.   HEENT: NC/AT. Dentition in good repair. No oropharyngeal erythema, tonsilar enlargement or exudates. Uvula midline. No submandibular or anterior cervical lymphadenopathy. TM well visualized bilaterally. Nares patent.  Cardiac: s1s2, RRR.  Lungs: CTAB, no chest wall tenderness.  Abdomen: NBS x4, soft, mild diffuse tenderness.  MSK: No obvious deformity to extremities. No midline spinal tenderness or tenderness over bony landmarks on extremities. 5/5 upper and lower extremity strength. Full ROM in all extremities  Neuro: CN II-XII intact. Sensation intact to light touch in all extremities. 5/5 strength in all extremities. Romberg negative.  PV: No LE edema or calf tenderness. Distal pulses 2+ in all extremities.

## 2020-03-08 NOTE — CONSULT NOTE ADULT - ASSESSMENT
A/P:  Anjelley PSBO or ileus   Likely gastroenteritis  F/U official repeat CT abd/pelvis with oral contrast  Advise medical service admission for medical care/workup  No anticipated acute surgical intervention for pt at this time

## 2020-03-09 VITALS
OXYGEN SATURATION: 100 % | TEMPERATURE: 98 F | SYSTOLIC BLOOD PRESSURE: 142 MMHG | HEART RATE: 64 BPM | RESPIRATION RATE: 16 BRPM | DIASTOLIC BLOOD PRESSURE: 80 MMHG

## 2020-03-09 NOTE — ED ADULT NURSE REASSESSMENT NOTE - NS ED NURSE REASSESS COMMENT FT1
Patient ambulated steady to restroom assisted back to bed and given reading glasses, magazines, and ordered breakfast. Patient awaiting social work
Pt remains aox2, calm and pleasant. VSS. 1:1 maintained. Awaiting discharge.
Received care of patient from Leonie TOBIAS. Patient in [facility] for enhanced observation. Awaiting social work consult to assess safety of discharge to [facility]
d/c cancelled, awaiting SW in am to assess pt for safety going back to [facility].

## 2020-03-10 LAB
CULTURE RESULTS: NO GROWTH — SIGNIFICANT CHANGE UP
SPECIMEN SOURCE: SIGNIFICANT CHANGE UP

## 2020-03-12 ENCOUNTER — EMERGENCY (EMERGENCY)
Facility: HOSPITAL | Age: 80
LOS: 0 days | Discharge: HOME | End: 2020-03-12
Attending: EMERGENCY MEDICINE
Payer: MEDICARE

## 2020-03-12 VITALS
DIASTOLIC BLOOD PRESSURE: 86 MMHG | HEART RATE: 63 BPM | TEMPERATURE: 98 F | OXYGEN SATURATION: 100 % | SYSTOLIC BLOOD PRESSURE: 148 MMHG | RESPIRATION RATE: 16 BRPM

## 2020-03-12 VITALS
OXYGEN SATURATION: 100 % | HEART RATE: 58 BPM | TEMPERATURE: 98 F | WEIGHT: 130.07 LBS | HEIGHT: 66 IN | DIASTOLIC BLOOD PRESSURE: 82 MMHG | SYSTOLIC BLOOD PRESSURE: 153 MMHG | RESPIRATION RATE: 16 BRPM

## 2020-03-12 DIAGNOSIS — F03.90 UNSPECIFIED DEMENTIA WITHOUT BEHAVIORAL DISTURBANCE: ICD-10-CM

## 2020-03-12 DIAGNOSIS — R19.7 DIARRHEA, UNSPECIFIED: ICD-10-CM

## 2020-03-12 LAB
ALBUMIN SERPL ELPH-MCNC: 3.5 G/DL — SIGNIFICANT CHANGE UP (ref 3.3–5)
ALP SERPL-CCNC: 87 U/L — SIGNIFICANT CHANGE UP (ref 40–120)
ALT FLD-CCNC: 29 U/L — SIGNIFICANT CHANGE UP (ref 12–78)
ANION GAP SERPL CALC-SCNC: 7 MMOL/L — SIGNIFICANT CHANGE UP (ref 5–17)
APPEARANCE UR: CLEAR — SIGNIFICANT CHANGE UP
AST SERPL-CCNC: 39 U/L — HIGH (ref 15–37)
BASOPHILS # BLD AUTO: 0.08 K/UL — SIGNIFICANT CHANGE UP (ref 0–0.2)
BASOPHILS NFR BLD AUTO: 1.5 % — SIGNIFICANT CHANGE UP (ref 0–2)
BILIRUB SERPL-MCNC: 0.7 MG/DL — SIGNIFICANT CHANGE UP (ref 0.2–1.2)
BILIRUB UR-MCNC: NEGATIVE — SIGNIFICANT CHANGE UP
BUN SERPL-MCNC: 15 MG/DL — SIGNIFICANT CHANGE UP (ref 7–23)
CALCIUM SERPL-MCNC: 8.6 MG/DL — SIGNIFICANT CHANGE UP (ref 8.5–10.1)
CHLORIDE SERPL-SCNC: 110 MMOL/L — HIGH (ref 96–108)
CO2 SERPL-SCNC: 25 MMOL/L — SIGNIFICANT CHANGE UP (ref 22–31)
COLOR SPEC: YELLOW — SIGNIFICANT CHANGE UP
CREAT SERPL-MCNC: 0.9 MG/DL — SIGNIFICANT CHANGE UP (ref 0.5–1.3)
DIFF PNL FLD: NEGATIVE — SIGNIFICANT CHANGE UP
EOSINOPHIL # BLD AUTO: 0.17 K/UL — SIGNIFICANT CHANGE UP (ref 0–0.5)
EOSINOPHIL NFR BLD AUTO: 3.3 % — SIGNIFICANT CHANGE UP (ref 0–6)
GLUCOSE SERPL-MCNC: 83 MG/DL — SIGNIFICANT CHANGE UP (ref 70–99)
GLUCOSE UR QL: NEGATIVE MG/DL — SIGNIFICANT CHANGE UP
HCT VFR BLD CALC: 39.4 % — SIGNIFICANT CHANGE UP (ref 34.5–45)
HGB BLD-MCNC: 13.8 G/DL — SIGNIFICANT CHANGE UP (ref 11.5–15.5)
IMM GRANULOCYTES NFR BLD AUTO: 0.2 % — SIGNIFICANT CHANGE UP (ref 0–1.5)
KETONES UR-MCNC: ABNORMAL
LEUKOCYTE ESTERASE UR-ACNC: NEGATIVE — SIGNIFICANT CHANGE UP
LYMPHOCYTES # BLD AUTO: 1.62 K/UL — SIGNIFICANT CHANGE UP (ref 1–3.3)
LYMPHOCYTES # BLD AUTO: 31.1 % — SIGNIFICANT CHANGE UP (ref 13–44)
MCHC RBC-ENTMCNC: 32.8 PG — SIGNIFICANT CHANGE UP (ref 27–34)
MCHC RBC-ENTMCNC: 35 GM/DL — SIGNIFICANT CHANGE UP (ref 32–36)
MCV RBC AUTO: 93.6 FL — SIGNIFICANT CHANGE UP (ref 80–100)
MONOCYTES # BLD AUTO: 0.71 K/UL — SIGNIFICANT CHANGE UP (ref 0–0.9)
MONOCYTES NFR BLD AUTO: 13.6 % — SIGNIFICANT CHANGE UP (ref 2–14)
NEUTROPHILS # BLD AUTO: 2.62 K/UL — SIGNIFICANT CHANGE UP (ref 1.8–7.4)
NEUTROPHILS NFR BLD AUTO: 50.3 % — SIGNIFICANT CHANGE UP (ref 43–77)
NITRITE UR-MCNC: NEGATIVE — SIGNIFICANT CHANGE UP
PH UR: 6 — SIGNIFICANT CHANGE UP (ref 5–8)
PLATELET # BLD AUTO: 203 K/UL — SIGNIFICANT CHANGE UP (ref 150–400)
POTASSIUM SERPL-MCNC: 3.5 MMOL/L — SIGNIFICANT CHANGE UP (ref 3.5–5.3)
POTASSIUM SERPL-SCNC: 3.5 MMOL/L — SIGNIFICANT CHANGE UP (ref 3.5–5.3)
PROT SERPL-MCNC: 7 GM/DL — SIGNIFICANT CHANGE UP (ref 6–8.3)
PROT UR-MCNC: NEGATIVE MG/DL — SIGNIFICANT CHANGE UP
RBC # BLD: 4.21 M/UL — SIGNIFICANT CHANGE UP (ref 3.8–5.2)
RBC # FLD: 12.3 % — SIGNIFICANT CHANGE UP (ref 10.3–14.5)
SODIUM SERPL-SCNC: 142 MMOL/L — SIGNIFICANT CHANGE UP (ref 135–145)
SP GR SPEC: 1 — LOW (ref 1.01–1.02)
UROBILINOGEN FLD QL: NEGATIVE MG/DL — SIGNIFICANT CHANGE UP
WBC # BLD: 5.21 K/UL — SIGNIFICANT CHANGE UP (ref 3.8–10.5)
WBC # FLD AUTO: 5.21 K/UL — SIGNIFICANT CHANGE UP (ref 3.8–10.5)

## 2020-03-12 PROCEDURE — 99283 EMERGENCY DEPT VISIT LOW MDM: CPT

## 2020-03-12 PROCEDURE — 87086 URINE CULTURE/COLONY COUNT: CPT

## 2020-03-12 PROCEDURE — 81003 URINALYSIS AUTO W/O SCOPE: CPT

## 2020-03-12 PROCEDURE — 99284 EMERGENCY DEPT VISIT MOD MDM: CPT

## 2020-03-12 PROCEDURE — 85025 COMPLETE CBC W/AUTO DIFF WBC: CPT

## 2020-03-12 PROCEDURE — 80053 COMPREHEN METABOLIC PANEL: CPT

## 2020-03-12 PROCEDURE — 93010 ELECTROCARDIOGRAM REPORT: CPT

## 2020-03-12 PROCEDURE — 36415 COLL VENOUS BLD VENIPUNCTURE: CPT

## 2020-03-12 PROCEDURE — 93005 ELECTROCARDIOGRAM TRACING: CPT

## 2020-03-12 NOTE — ED ADULT NURSE NOTE - OBJECTIVE STATEMENT
pt aaox1, is 80 yo female poor historian bibems for diarrhea, pt states she had one diarrhea episode at home, called ambulance to bring her to ED.  denies fever, chills, abd pain, or urinary symptoms.

## 2020-03-12 NOTE — ED PROVIDER NOTE - PHYSICAL EXAMINATION
GEN APPEARANCE: WDWN, alert and cooperative, non-toxic appearing and in NAD A&Ox2  HEAD: Atraumatic, normocephalic   EYES: PERRLa, EOMI, vision grossly intact.   EARS: Gross hearing intact.   NOSE: No nasal discharge, no external evidence of epistaxis.   NECK: Supple  CV: RRR, S1S2, no c/r/m/g. No cyanosis or pallor. Extremities warm, well perfused. Cap refill <2 seconds. No bruits.   LUNGS: CTAB. No wheezing. No rales. No rhonchi. No diminished breath sounds.   ABDOMEN: Soft, NTND. No guarding or rebound. No masses.   MSK: Spine appears normal, no spine point tenderness. No CVA ttp. No joint erythema or tenderness. Normal muscular development. Pelvis stable.  EXTREMITIES: No peripheral edema. No obvious joint or bony deformity.  NEURO: Alert, follows commands. Weight bearing normal. Speech normal. Sensation and motor normal x4 extremities.   SKIN: Normal color for race, warm, dry and intact. No evidence of rash.  PSYCH: Normal mood and affect.

## 2020-03-12 NOTE — ED PROVIDER NOTE - CLINICAL SUMMARY MEDICAL DECISION MAKING FREE TEXT BOX
Harjeet PGY3: 79F hx of dementia, recently seen in [facility] ED x2 days ago presents with a cc of x1 episode NB loose diarrhea, no abdominal pain, no fever, no nausea or vomiting, cannot recall why was in ED x2 days ago, says "I usually don't get sick".  exam vss non toxic non focal exam A&Ox2, will check labs ekg reassess.

## 2020-03-12 NOTE — ED PROVIDER NOTE - PATIENT PORTAL LINK FT
You can access the FollowMyHealth Patient Portal offered by [facility] by registering at the following website: [email or web address]. By joining [employer]’s FollowMyHealth portal, you will also be able to view your health information using other applications (apps) compatible with our system.

## 2020-03-12 NOTE — ED ADULT NURSE NOTE - NSIMPLEMENTINTERV_GEN_ALL_ED
Implemented All Fall Risk Interventions:  [place] to call system. Call bell, personal items and telephone within reach. Instruct patient to call for assistance. Room bathroom lighting operational. Non-slip footwear when patient is off stretcher. Physically safe environment: no spills, clutter or unnecessary equipment. Stretcher in lowest position, wheels locked, appropriate side rails in place. Provide visual cue, wrist band, yellow gown, etc. Monitor gait and stability. Monitor for mental status changes and reorient to person, place, and time. Review medications for side effects contributing to fall risk. Reinforce activity limits and safety measures with patient and family.

## 2020-03-12 NOTE — ED PROVIDER NOTE - OBJECTIVE STATEMENT
79F hx of dementia, recently seen in [facility] ED x2 days ago presents with a cc of x1 episode NB loose diarrhea, no abdominal pain, no fever, no nausea or vomiting, cannot recall why was in ED x2 days ago, says "I usually don't get sick". Poor historian. On chart review has had x3 visits in last x12 days. Lives alone. Only takes musinex as needed. Denies n/v/f/c/cp/sob. Denies headache, syncope, lightheadedness, dizziness. Denies chest palpitations, abdominal pain. Denies dysuria, hematuria, hematochezia, BRBPR, tarry stools, diarrhea, constipation.

## 2020-03-12 NOTE — ED PROVIDER NOTE - ATTENDING CONTRIBUTION TO CARE
I, Aditya Piña MD, personally saw the patient with the resident, and completed the key components of the history and physical exam. I then discussed the management plan with the resident.     Patient with loose stools, dementia here with vague complaints.  Plan: labs, social work eval

## 2020-03-12 NOTE — ED PROVIDER NOTE - PROGRESS NOTE DETAILS
Harjeet PGY3: discussed with sw, will plan for uber home and discharge home. does not meet admission criteria at this time.

## 2020-03-12 NOTE — ED PROVIDER NOTE - NSFOLLOWUPINSTRUCTIONS_ED_ALL_ED_FT
Thank you for visiting our Emergency Department, it has been a pleasure taking part in your healthcare.    Your discharge diagnosis is: diarrhea   Please take all discharge medications as indicated below:  Please continue all medications as rx'd by your PMD.  Please follow up with your PMD within x48 hours.  A copy of resulted labs, imaging, and findings have been provided to you.   You have had a detailed discussion with your provider regarding your diagnosis, care management and discharge planning including, but not limited to: return precautions, follow up visits with existing or new providers, new prescriptions and/or medication changes, wound and/or splint/cast care or other care   aspects specific to your diagnosis and treatment. You have been given the opportunity to have your questions answered. At this time you have been deemed stable and fit for discharge.  Return precautions to the Emergency Department include but are not limited to: unrelenting nausea, vomiting, fever, chills, chest pain, shortness of breath, dizziness, chest or abdominal pain, worsening back pain, syncope, blood in urine or stool, headache that doesn't resolve, numbness or tingling, loss of sensation, loss of motor function, or any other concerning symptoms.

## 2020-03-13 LAB
CULTURE RESULTS: NO GROWTH — SIGNIFICANT CHANGE UP
SPECIMEN SOURCE: SIGNIFICANT CHANGE UP

## 2020-08-11 NOTE — ED STATDOCS - CPE ED GASTRO NORM
normal... Ear Wedge Repair Text: A wedge excision was completed by carrying down an excision through the full thickness of the ear and cartilage with an inward facing Burow's triangle. The wound was then closed in a layered fashion.

## 2021-05-08 ENCOUNTER — INPATIENT (INPATIENT)
Facility: HOSPITAL | Age: 81
LOS: 3 days | Discharge: NURSING, REHAB OR LONG-TERM CARE | DRG: 57 | End: 2021-05-12
Attending: INTERNAL MEDICINE | Admitting: INTERNAL MEDICINE
Payer: MEDICARE

## 2021-05-08 VITALS
OXYGEN SATURATION: 98 % | TEMPERATURE: 98 F | WEIGHT: 179.9 LBS | SYSTOLIC BLOOD PRESSURE: 142 MMHG | RESPIRATION RATE: 18 BRPM | HEART RATE: 65 BPM | DIASTOLIC BLOOD PRESSURE: 85 MMHG | HEIGHT: 66 IN

## 2021-05-08 DIAGNOSIS — R41.0 DISORIENTATION, UNSPECIFIED: ICD-10-CM

## 2021-05-08 LAB
ALBUMIN SERPL ELPH-MCNC: 3.5 G/DL — SIGNIFICANT CHANGE UP (ref 3.3–5)
ALP SERPL-CCNC: 101 U/L — SIGNIFICANT CHANGE UP (ref 40–120)
ALT FLD-CCNC: 21 U/L — SIGNIFICANT CHANGE UP (ref 12–78)
ANION GAP SERPL CALC-SCNC: 3 MMOL/L — LOW (ref 5–17)
APPEARANCE UR: CLEAR — SIGNIFICANT CHANGE UP
AST SERPL-CCNC: 29 U/L — SIGNIFICANT CHANGE UP (ref 15–37)
BACTERIA # UR AUTO: ABNORMAL
BASOPHILS # BLD AUTO: 0.19 K/UL — SIGNIFICANT CHANGE UP (ref 0–0.2)
BASOPHILS NFR BLD AUTO: 2.7 % — HIGH (ref 0–2)
BILIRUB SERPL-MCNC: 0.5 MG/DL — SIGNIFICANT CHANGE UP (ref 0.2–1.2)
BILIRUB UR-MCNC: NEGATIVE — SIGNIFICANT CHANGE UP
BUN SERPL-MCNC: 13 MG/DL — SIGNIFICANT CHANGE UP (ref 7–23)
CALCIUM SERPL-MCNC: 8.7 MG/DL — SIGNIFICANT CHANGE UP (ref 8.5–10.1)
CHLORIDE SERPL-SCNC: 111 MMOL/L — HIGH (ref 96–108)
CO2 SERPL-SCNC: 28 MMOL/L — SIGNIFICANT CHANGE UP (ref 22–31)
COLOR SPEC: YELLOW — SIGNIFICANT CHANGE UP
CREAT SERPL-MCNC: 0.88 MG/DL — SIGNIFICANT CHANGE UP (ref 0.5–1.3)
DIFF PNL FLD: ABNORMAL
EOSINOPHIL # BLD AUTO: 0.35 K/UL — SIGNIFICANT CHANGE UP (ref 0–0.5)
EOSINOPHIL NFR BLD AUTO: 5.1 % — SIGNIFICANT CHANGE UP (ref 0–6)
EPI CELLS # UR: SIGNIFICANT CHANGE UP
GLUCOSE BLDC GLUCOMTR-MCNC: 92 MG/DL — SIGNIFICANT CHANGE UP (ref 70–99)
GLUCOSE SERPL-MCNC: 88 MG/DL — SIGNIFICANT CHANGE UP (ref 70–99)
GLUCOSE UR QL: NEGATIVE MG/DL — SIGNIFICANT CHANGE UP
HCT VFR BLD CALC: 40.6 % — SIGNIFICANT CHANGE UP (ref 34.5–45)
HGB BLD-MCNC: 13.3 G/DL — SIGNIFICANT CHANGE UP (ref 11.5–15.5)
IMM GRANULOCYTES NFR BLD AUTO: 0.3 % — SIGNIFICANT CHANGE UP (ref 0–1.5)
KETONES UR-MCNC: ABNORMAL
LEUKOCYTE ESTERASE UR-ACNC: ABNORMAL
LYMPHOCYTES # BLD AUTO: 2.27 K/UL — SIGNIFICANT CHANGE UP (ref 1–3.3)
LYMPHOCYTES # BLD AUTO: 32.8 % — SIGNIFICANT CHANGE UP (ref 13–44)
MAGNESIUM SERPL-MCNC: 2.4 MG/DL — SIGNIFICANT CHANGE UP (ref 1.6–2.6)
MCHC RBC-ENTMCNC: 31.7 PG — SIGNIFICANT CHANGE UP (ref 27–34)
MCHC RBC-ENTMCNC: 32.8 GM/DL — SIGNIFICANT CHANGE UP (ref 32–36)
MCV RBC AUTO: 96.7 FL — SIGNIFICANT CHANGE UP (ref 80–100)
MONOCYTES # BLD AUTO: 1.03 K/UL — HIGH (ref 0–0.9)
MONOCYTES NFR BLD AUTO: 14.9 % — HIGH (ref 2–14)
NEUTROPHILS # BLD AUTO: 3.06 K/UL — SIGNIFICANT CHANGE UP (ref 1.8–7.4)
NEUTROPHILS NFR BLD AUTO: 44.2 % — SIGNIFICANT CHANGE UP (ref 43–77)
NITRITE UR-MCNC: NEGATIVE — SIGNIFICANT CHANGE UP
PH UR: 6 — SIGNIFICANT CHANGE UP (ref 5–8)
PLATELET # BLD AUTO: 254 K/UL — SIGNIFICANT CHANGE UP (ref 150–400)
POTASSIUM SERPL-MCNC: 4.5 MMOL/L — SIGNIFICANT CHANGE UP (ref 3.5–5.3)
POTASSIUM SERPL-SCNC: 4.5 MMOL/L — SIGNIFICANT CHANGE UP (ref 3.5–5.3)
PROT SERPL-MCNC: 7.3 GM/DL — SIGNIFICANT CHANGE UP (ref 6–8.3)
PROT UR-MCNC: NEGATIVE MG/DL — SIGNIFICANT CHANGE UP
RBC # BLD: 4.2 M/UL — SIGNIFICANT CHANGE UP (ref 3.8–5.2)
RBC # FLD: 12.5 % — SIGNIFICANT CHANGE UP (ref 10.3–14.5)
RBC CASTS # UR COMP ASSIST: SIGNIFICANT CHANGE UP /HPF (ref 0–4)
SARS-COV-2 RNA SPEC QL NAA+PROBE: SIGNIFICANT CHANGE UP
SODIUM SERPL-SCNC: 142 MMOL/L — SIGNIFICANT CHANGE UP (ref 135–145)
SP GR SPEC: 1.01 — SIGNIFICANT CHANGE UP (ref 1.01–1.02)
TROPONIN I SERPL-MCNC: <0.015 NG/ML — SIGNIFICANT CHANGE UP (ref 0.01–0.04)
UROBILINOGEN FLD QL: NEGATIVE MG/DL — SIGNIFICANT CHANGE UP
WBC # BLD: 6.92 K/UL — SIGNIFICANT CHANGE UP (ref 3.8–10.5)
WBC # FLD AUTO: 6.92 K/UL — SIGNIFICANT CHANGE UP (ref 3.8–10.5)
WBC UR QL: SIGNIFICANT CHANGE UP

## 2021-05-08 PROCEDURE — 99223 1ST HOSP IP/OBS HIGH 75: CPT

## 2021-05-08 PROCEDURE — U0005: CPT

## 2021-05-08 PROCEDURE — 71045 X-RAY EXAM CHEST 1 VIEW: CPT | Mod: 26

## 2021-05-08 PROCEDURE — 93010 ELECTROCARDIOGRAM REPORT: CPT

## 2021-05-08 PROCEDURE — 97116 GAIT TRAINING THERAPY: CPT | Mod: GP

## 2021-05-08 PROCEDURE — 97163 PT EVAL HIGH COMPLEX 45 MIN: CPT | Mod: GP

## 2021-05-08 PROCEDURE — 96372 THER/PROPH/DIAG INJ SC/IM: CPT | Mod: XU

## 2021-05-08 PROCEDURE — 70450 CT HEAD/BRAIN W/O DYE: CPT | Mod: 26

## 2021-05-08 PROCEDURE — 99285 EMERGENCY DEPT VISIT HI MDM: CPT | Mod: CS

## 2021-05-08 PROCEDURE — U0003: CPT

## 2021-05-08 PROCEDURE — 99285 EMERGENCY DEPT VISIT HI MDM: CPT | Mod: 25

## 2021-05-08 RX ORDER — HEPARIN SODIUM 5000 [USP'U]/ML
5000 INJECTION INTRAVENOUS; SUBCUTANEOUS EVERY 12 HOURS
Refills: 0 | Status: DISCONTINUED | OUTPATIENT
Start: 2021-05-08 | End: 2021-05-12

## 2021-05-08 NOTE — H&P ADULT - HISTORY OF PRESENT ILLNESS
HPI: The patient is an 81 yo female with Hx. of Dementia, HTN, asthma, HLD , glaucoma who was brought to the ER from [facility] because  she was found wandering, confused, in the parking lot. The patient had a private aid  who takes care of her because of dementia but the aid did not sow up today.    PMHx:  Dementia, HTN, asthma, HLD , glaucoma     PSHx: not obtainable secondary to dementia.     Family Hx: not obtainable secondary to dementia.     Social Hx.: not smoking, no alcohol use    ROS: not obtainable secondary to dementia.       Physical Exam: Vital Signs Last 24 Hrs  T(C): 36.8 (08 May 2021 19:55), Max: 36.8 (08 May 2021 16:31)  T(F): 98.2 (08 May 2021 19:55), Max: 98.2 (08 May 2021 16:31)  HR: 77 (08 May 2021 19:55) (65 - 77)  BP: 142/73 (08 May 2021 19:55) (142/73 - 142/85)  BP(mean): --  RR: 16 (08 May 2021 19:55) (16 - 18)  SpO2: 97% (08 May 2021 19:55) (97% - 98%)        HEENT: PRRL EOMI    MOUTH/TEETH/GUMS: Clear    NECK: no JVD    LUNGS: Clear    HEART: S1,S2 RR    ABDOMEN: soft nontender    EXTREMITIES:  no pedal edema    MUSCULOSKELETAL: no joint swelling     NEURO: no tremor, no focal signs.    SKIN: no rash    : CVA negative,     Lab:                       13.3   6.92  )-----------( 254      ( 08 May 2021 17:14 )             40.6       05-08    142  |  111<H>  |  13  ----------------------------<  88  4.5   |  28  |  0.88    Ca    8.7      08 May 2021 17:14  Mg     2.4     05-08    TPro  7.3  /  Alb  3.5  /  TBili  0.5  /  DBili  x   /  AST  29  /  ALT  21  /  AlkPhos  101  05-08    EKG NSR no acute ST changes

## 2021-05-08 NOTE — ED ADULT NURSE NOTE - NIH STROKE SCALE: 6A. MOTOR LEG, LEFT, QM
Denies known Latex allergy or symptoms of Latex sensitivity.  Medications verified, no changes.  Pt referred by her daughter for right knee pain. Has had pain for many yrs. Has had cortisone, PRPx 2 and stem cell.   (0) No drift; leg holds 30 degree position for full 5 secs

## 2021-05-08 NOTE — ED ADULT NURSE NOTE - NSIMPLEMENTINTERV_GEN_ALL_ED
Implemented All Fall with Harm Risk Interventions:  [place] to call system. Call bell, personal items and telephone within reach. Instruct patient to call for assistance. Room bathroom lighting operational. Non-slip footwear when patient is off stretcher. Physically safe environment: no spills, clutter or unnecessary equipment. Stretcher in lowest position, wheels locked, appropriate side rails in place. Provide visual cue, wrist band, yellow gown, etc. Monitor gait and stability. Monitor for mental status changes and reorient to person, place, and time. Review medications for side effects contributing to fall risk. Reinforce activity limits and safety measures with patient and family. Provide visual clues: red socks.

## 2021-05-08 NOTE — H&P ADULT - ASSESSMENT
81 yo female with Hx. of Dementia, HTN, asthma, HLD , glaucoma who was brought to the ER from [facility] because  she was found wandering, confused, in the parking lot. The patient had a private aid  who takes care of her because of dementia but the aid did not sow up today.  assessment Dx:                       1. AMS                       2. HTN                       3. Asthma                       4. Dementia    Plan:    admit to medicine               medications: not on medicationss               VTEP: Heparin                Consult : Psychiatry, .

## 2021-05-08 NOTE — ED ADULT NURSE NOTE - OBJECTIVE STATEMENT
Pt brought in by ambulance found wandering on street. Pt is resident of [facility]. Pt history dimentia, unable to answer further questions. Pt states, "You are God, I am God, God is the way!" Pt moves all extremities, denies medical complaints. Pt alert and awake, oriented to self. Pt with +2 edema bilateral lower extremity

## 2021-05-08 NOTE — ED PROVIDER NOTE - PROGRESS NOTE DETAILS
Per SW, patient has partial day coverage, family not involved, patient unsafe to be discharged home. pt to be admitted.

## 2021-05-08 NOTE — ED ADULT TRIAGE NOTE - CHIEF COMPLAINT QUOTE
Pt arrives to found by bystanders wandering in a parking lot. pt confused in triage. denies complaints at this time. pt resides at [facility]. BGM 92.

## 2021-05-08 NOTE — ED PROVIDER NOTE - CLINICAL SUMMARY MEDICAL DECISION MAKING FREE TEXT BOX
Pt with history of dementia brought in after being found wandering on the streets, unclear baseline. Will do delirium workup vs worsening of dementia. Pt seems to live alone, independently, unsafe discharge at this time. Will likely need social work intervention and possible admission.

## 2021-05-08 NOTE — ED PROVIDER NOTE - UNABLE TO OBTAIN
Pt poorly able to contribute to history due to dementia Pt poorly able to contribute to ROS due to dementia Dementia

## 2021-05-08 NOTE — INPATIENT CERTIFICATION FOR MEDICARE PATIENTS - THE STATUS OF COMORBIDITIES.
2. The status of comorbities. (See ED/admit documents)
2. The status of comorbities. (See ED/admit documents)

## 2021-05-08 NOTE — ED PROVIDER NOTE - OBJECTIVE STATEMENT
81 y/o female with PMHx of dementia, HTN, asthma, HLD, glaucoma presents to the ED for AMS. Pt was found by bystanders wandering in a parking lot. Resident of [facility]. Denies feeling ill, SOB, cough, chest pain, nausea, vomiting. Pt does not know why she is in HHED; oriented to person only. Poorly able to contribute to history at this time.

## 2021-05-08 NOTE — ED PROVIDER NOTE - PHYSICAL EXAMINATION
Vital signs as available reviewed.  General:  Comfortable, no acute distress.  Head:  Normocephalic, atraumatic.  Eyes:  Conjunctiva pink, no icterus.  Cardiovascular:  Regular rate, no obvious murmur.  Respiratory:  Clear to auscultation, good air entry bilaterally.  Abdomen:  Soft, non-tender.  Musculoskeletal:  No deformity or calf tenderness. +b/l 3+ pitting edema  Neurologic: Alert and oriented to self only, moving all extremities, follows commands intermittently, extra-occular movements intact, no visual fields defect, no facial asymmetry, no upper extremity drift, no lower extremity drift, no sensory deficit, no dysarthria, no inattention, unclear if pt with word finding difficulties vs nonlinear thought process.   Skin:  Warm and dry.

## 2021-05-08 NOTE — ED ADULT NURSE NOTE - CHIEF COMPLAINT QUOTE
Pt arrives to found by bystanders wandering in a parking lot. pt confused in triage. denies complaints at this time. pt resides at [facility]. BGM 92.

## 2021-05-08 NOTE — INPATIENT CERTIFICATION FOR MEDICARE PATIENTS - PHYSICIAN CONCUR
I concur with the Admission Order and I certify that services are provided in accordance with Section 42 CFR § 412.3
I concur with the Admission Order and I certify that services are provided in accordance with Section 42 CFR § 412.3

## 2021-05-08 NOTE — ED ADULT NURSE REASSESSMENT NOTE - NS ED NURSE REASSESS COMMENT FT1
as per  Sindi, If pt medically cleared, pt can be transported back to [facility] tomorrow at 10am, pt aid will be there to pick her up. Pt resting comfortably in bed at this time

## 2021-05-09 DIAGNOSIS — F03.90 UNSPECIFIED DEMENTIA, UNSPECIFIED SEVERITY, WITHOUT BEHAVIORAL DISTURBANCE, PSYCHOTIC DISTURBANCE, MOOD DISTURBANCE, AND ANXIETY: ICD-10-CM

## 2021-05-09 PROCEDURE — 99232 SBSQ HOSP IP/OBS MODERATE 35: CPT

## 2021-05-09 PROCEDURE — 99233 SBSQ HOSP IP/OBS HIGH 50: CPT

## 2021-05-09 RX ORDER — MEMANTINE HYDROCHLORIDE 10 MG/1
5 TABLET ORAL DAILY
Refills: 0 | Status: DISCONTINUED | OUTPATIENT
Start: 2021-05-09 | End: 2021-05-12

## 2021-05-09 RX ADMIN — MEMANTINE HYDROCHLORIDE 5 MILLIGRAM(S): 10 TABLET ORAL at 15:44

## 2021-05-09 RX ADMIN — HEPARIN SODIUM 5000 UNIT(S): 5000 INJECTION INTRAVENOUS; SUBCUTANEOUS at 09:17

## 2021-05-09 RX ADMIN — HEPARIN SODIUM 5000 UNIT(S): 5000 INJECTION INTRAVENOUS; SUBCUTANEOUS at 21:06

## 2021-05-09 NOTE — BEHAVIORAL HEALTH ASSESSMENT NOTE - NSBHCHARTREVIEWLAB_PSY_A_CORE FT
05-08    142  |  111<H>  |  13  ----------------------------<  88  4.5   |  28  |  0.88    Ca    8.7      08 May 2021 17:14  Mg     2.4     05-08    TPro  7.3  /  Alb  3.5  /  TBili  0.5  /  DBili  x   /  AST  29  /  ALT  21  /  AlkPhos  101  05-08

## 2021-05-09 NOTE — BEHAVIORAL HEALTH ASSESSMENT NOTE - SUMMARY
80 year-old  female, living alone with health home aide, with no family in local area, with charted history of Dementia (3.2020), with no other known psychiatric history, no known prior in-patient hospitalization or suicidal ideation or suicide attempt, presenting secondary to wandering in the setting of health home aid "not showing up."    Patient unable to provide and meaningful history in the setting of Dementia, known (charted in EMR) as of 3.2020. Baseline is unknown. Patient living with health home aid. Patient has no local family. Patient however is not impulsive, irritable, agitated or labile. Patient is in good behavioral and impulse control. Patient likely to require placement secondary to progressive Dementia.    Kena Fitzgerald  [phone] called but [employer] appears to be a company. No other phone numbers are known for collateral.    PLAN  1. Namenda 5 mg daily.

## 2021-05-09 NOTE — BEHAVIORAL HEALTH ASSESSMENT NOTE - OTHER
flat impaired by dementia - unable to assess no known suicidal ideation or suicide attempt; not appearing depressed unable to cooperate in the setting of Dementia related confusion did not ambulate - in bed As per HPI Home health aid

## 2021-05-09 NOTE — BEHAVIORAL HEALTH ASSESSMENT NOTE - HPI (INCLUDE ILLNESS QUALITY, SEVERITY, DURATION, TIMING, CONTEXT, MODIFYING FACTORS, ASSOCIATED SIGNS AND SYMPTOMS)
80 year-old  female, living alone with health home aide, with no family in local area, with charted history of Dementia (3.2020), with no other known psychiatric history, no known prior in-patient hospitalization or suicidal ideation or suicide attempt, presenting secondary to wandering in the setting of health home aid "not showing up."    Patient is alert and oriented to person, but not time, place or situation. Patient is calm, disorganized, inattentive, poorly related. When questions are posed, patient reads off names of providers on the board. Patient unable to provide and meaningful history in the setting of Dementia. Baseline is unknown. Patient however is not impulsive, irritable, agitated or labile. Patient is in good behavioral and impulse control.     Kena Fitzgerald  [phone] called but [employer] appears to be a company. No other phone numbers are known for collateral.

## 2021-05-09 NOTE — BEHAVIORAL HEALTH ASSESSMENT NOTE - NSBHCHARTREVIEWIMAGING_PSY_A_CORE FT
EXAM:  CT BRAIN                            PROCEDURE DATE:  05/08/2021          INTERPRETATION:  CLINICAL STATEMENT: Confusion    TECHNIQUE: CT of the head was performed without IV contrast.  RAPID artificial intelligence was utilized for the preliminary evaluation of intracranial hemorrhage.    COMPARISON: CT head 3/1/2020    FINDINGS:  There is mild diffuse parenchymal volume loss. There are areas of low attenuation in the periventricular white matter likely related to mild chronic microvascular ischemic changes.    There is no acute intracranial hemorrhage, parenchymal mass, mass effect or midline shift. There is no acute territorial infarct. There is no hydrocephalus.    The cranium is intact. 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    Mild mucosal thickening paranasal sinuses.    IMPRESSION:  No acute intracranial hemorrhage or acute territorial infarct.  If symptoms persist, follow-up MRI exam recommended.            TIGIST WINSTON MD; Attending Radiologist  This document has been electronically signed. May  8 2021  6:10PM

## 2021-05-09 NOTE — BEHAVIORAL HEALTH ASSESSMENT NOTE - NSBHCHARTREVIEWVS_PSY_A_CORE FT
Vital Signs Last 24 Hrs  T(C): 36.8 (08 May 2021 23:54), Max: 36.9 (08 May 2021 23:34)  T(F): 98.3 (08 May 2021 23:54), Max: 98.4 (08 May 2021 23:34)  HR: 79 (08 May 2021 23:54) (65 - 79)  BP: 134/66 (08 May 2021 23:54) (134/66 - 142/85)  BP(mean): --  RR: 17 (08 May 2021 23:54) (16 - 18)  SpO2: 94% (08 May 2021 23:54) (94% - 98%)

## 2021-05-09 NOTE — BEHAVIORAL HEALTH ASSESSMENT NOTE - NSBHREFERDETAILS_PSY_A_CORE_FT
The patient is an 79 yo female with Hx. of Dementia, HTN, asthma, HLD , glaucoma who was brought to the ER from [facility] because  she was found wandering, confused, in the parking lot. The patient had a private aid  who takes care of her because of dementia but the aid did not sow up today.

## 2021-05-10 DIAGNOSIS — G30.1 ALZHEIMER'S DISEASE WITH LATE ONSET: ICD-10-CM

## 2021-05-10 DIAGNOSIS — G30.9 ALZHEIMER'S DISEASE, UNSPECIFIED: ICD-10-CM

## 2021-05-10 LAB
CULTURE RESULTS: NO GROWTH — SIGNIFICANT CHANGE UP
SPECIMEN SOURCE: SIGNIFICANT CHANGE UP

## 2021-05-10 PROCEDURE — 99232 SBSQ HOSP IP/OBS MODERATE 35: CPT

## 2021-05-10 RX ADMIN — MEMANTINE HYDROCHLORIDE 5 MILLIGRAM(S): 10 TABLET ORAL at 09:25

## 2021-05-10 RX ADMIN — HEPARIN SODIUM 5000 UNIT(S): 5000 INJECTION INTRAVENOUS; SUBCUTANEOUS at 21:29

## 2021-05-10 RX ADMIN — HEPARIN SODIUM 5000 UNIT(S): 5000 INJECTION INTRAVENOUS; SUBCUTANEOUS at 09:25

## 2021-05-10 NOTE — PHYSICAL THERAPY INITIAL EVALUATION ADULT - PLANNED THERAPY INTERVENTIONS, PT EVAL
No further PT needs at this time as pt demonstrates good strength and functional mobility with no AD or Assistance; Will d/c PT at this time

## 2021-05-10 NOTE — PHYSICAL THERAPY INITIAL EVALUATION ADULT - GENERAL OBSERVATIONS, REHAB EVAL
Pt rec'd sitting on EOB in NAD pleasantly confused with 1:1 present; pt denied any pain or discomfort

## 2021-05-10 NOTE — PROGRESS NOTE BEHAVIORAL HEALTH - NSBHCHARTREVIEWLAB_PSY_A_CORE FT
13.3   6.92  )-----------( 254      ( 08 May 2021 17:14 )             40.6   05-08    142  |  111<H>  |  13  ----------------------------<  88  4.5   |  28  |  0.88    Ca    8.7      08 May 2021 17:14  Mg     2.4     05-08    TPro  7.3  /  Alb  3.5  /  TBili  0.5  /  DBili  x   /  AST  29  /  ALT  21  /  AlkPhos  101  05-08

## 2021-05-10 NOTE — PROGRESS NOTE ADULT - ASSESSMENT
79 yo female with Hx. of Dementia, HTN, asthma, HLD , glaucoma who was brought to the ER from [facility] because  she was found wandering, confused, in the parking lot. The patient had a private aid  who takes care of her because of dementia but the aid did not sow up today.  assessment Dx:                       1. AMS/ encephalopathy vs sec to advanced dementia                       2. HTN                       3. Asthma, stable                       4. Dementia    1. AMS due to progressive dementia  - CT head : NEG  - CXR : clear  - UA : NEG   - no neuro deficits  - continue 1 : 1   - discussed with case management will need placement     dvt ppx: heparin    code: FULL 
79 yo female with Hx. of Dementia, HTN, asthma, HLD , glaucoma who was brought to the ER from [facility] because  she was found wandering, confused, in the parking lot. The patient had a private aid  who takes care of her because of dementia but the aid did not sow up today.  assessment Dx:                       1. AMS/ encephalopathy vs sec to advanced dementia                       2. HTN                       3. Asthma, stable                       4. Dementia    Plan:    admit to medicine              Psych consult appreciated             1:1 observation  for safety                VTEP: Heparin s/q

## 2021-05-11 ENCOUNTER — TRANSCRIPTION ENCOUNTER (OUTPATIENT)
Age: 81
End: 2021-05-11

## 2021-05-11 LAB — SARS-COV-2 RNA SPEC QL NAA+PROBE: SIGNIFICANT CHANGE UP

## 2021-05-11 PROCEDURE — 99239 HOSP IP/OBS DSCHRG MGMT >30: CPT

## 2021-05-11 RX ORDER — MEMANTINE HYDROCHLORIDE 10 MG/1
1 TABLET ORAL
Qty: 0 | Refills: 0 | DISCHARGE
Start: 2021-05-11

## 2021-05-11 RX ADMIN — HEPARIN SODIUM 5000 UNIT(S): 5000 INJECTION INTRAVENOUS; SUBCUTANEOUS at 21:44

## 2021-05-11 RX ADMIN — HEPARIN SODIUM 5000 UNIT(S): 5000 INJECTION INTRAVENOUS; SUBCUTANEOUS at 09:23

## 2021-05-11 RX ADMIN — MEMANTINE HYDROCHLORIDE 5 MILLIGRAM(S): 10 TABLET ORAL at 09:16

## 2021-05-11 NOTE — DISCHARGE NOTE PROVIDER - HOSPITAL COURSE
79 yo female with Hx. of Dementia, HTN, asthma, HLD , glaucoma who was brought to the ER from [facility] because  she was found wandering, confused, in the parking lot. The patient had a private aid  who takes care of her because of dementia but the aid did not sow up.    5/9: pt still very confused; not able to make any meaningful conversation  on 1:1    5/10 : Patient is pleasantly confused. I have discussed with case management and will work on placement. I left message with contact in the chart.     Patient during her stay had    - CT head : NEG  - CXR : clear  - UA : NEG     She is noted to have progressive dementia. When calling her home care agency they told me they have been trying to get her more help at home however due to her insurance they could not get that provided to her. She has a daughter who lives in [place]      Patient during her stay was seen by psych and continued on her namenda     She will need long term placement .    SHe Is stable for DC

## 2021-05-12 ENCOUNTER — TRANSCRIPTION ENCOUNTER (OUTPATIENT)
Age: 81
End: 2021-05-12

## 2021-05-12 VITALS
TEMPERATURE: 98 F | HEART RATE: 65 BPM | OXYGEN SATURATION: 99 % | DIASTOLIC BLOOD PRESSURE: 83 MMHG | RESPIRATION RATE: 18 BRPM | SYSTOLIC BLOOD PRESSURE: 142 MMHG

## 2021-05-12 PROCEDURE — 99232 SBSQ HOSP IP/OBS MODERATE 35: CPT

## 2021-05-12 RX ADMIN — MEMANTINE HYDROCHLORIDE 5 MILLIGRAM(S): 10 TABLET ORAL at 11:19

## 2021-05-12 NOTE — PROGRESS NOTE BEHAVIORAL HEALTH - NSBHCONSULTFOLLOWAFTERCARE_PSY_A_CORE FT
PT needs 24/7 supervision if d/c-ed home or transfer to dementia unit of [facility] or NH.
PT needs 24/7 supervision if d/c-ed h9me or transfer to dementia unit of [facility] or NH.

## 2021-05-12 NOTE — PROGRESS NOTE ADULT - SUBJECTIVE AND OBJECTIVE BOX
HPI: The patient is an 81 yo female with Hx. of Dementia, HTN, asthma, HLD , glaucoma who was brought to the ER from [facility] because  she was found wandering, confused, in the parking lot. The patient had a private aid  who takes care of her because of dementia but the aid did not sow up.    : pt still very confused; not able to make any meaningful conversation  on 1:1      PHYSICAL EXAM:    Daily Height in cm: 167.64 (08 May 2021 16:31)    Daily     ICU Vital Signs Last 24 Hrs  T(C): 36.8 (08 May 2021 23:54), Max: 36.9 (08 May 2021 23:34)  T(F): 98.3 (08 May 2021 23:54), Max: 98.4 (08 May 2021 23:34)  HR: 79 (08 May 2021 23:54) (65 - 79)  BP: 134/66 (08 May 2021 23:54) (134/66 - 142/85)  BP(mean): --  ABP: --  ABP(mean): --  RR: 17 (08 May 2021 23:54) (16 - 18)  SpO2: 94% (08 May 2021 23:54) (94% - 98%)      Constitutional: Well appearing  HEENT: Atraumatic, CANDIS, Normal, No congestion  Respiratory: Breath Sounds normal, no rhonchi/wheeze  Cardiovascular: N S1S2;   Gastrointestinal: Abdomen soft, non tender, Bowel Sounds present  Extremities: No edema, peripheral pulses present  Neurological: AAO x 0, no gross focal motor deficits  Skin: Non cellulitic, no rash, ulcers  Lymph Nodes: No lymphadenopathy noted  Back: No CVA tenderness   Musculoskeletal: non tender  Breasts: Deferred  Genitourinary: deferred  Rectal: Deferred                          13.3   6.92  )-----------( 254      ( 08 May 2021 17:14 )             40.6       CBC Full  -  ( 08 May 2021 17:14 )  WBC Count : 6.92 K/uL  RBC Count : 4.20 M/uL  Hemoglobin : 13.3 g/dL  Hematocrit : 40.6 %  Platelet Count - Automated : 254 K/uL  Mean Cell Volume : 96.7 fl  Mean Cell Hemoglobin : 31.7 pg  Mean Cell Hemoglobin Concentration : 32.8 gm/dL  Auto Neutrophil # : 3.06 K/uL  Auto Lymphocyte # : 2.27 K/uL  Auto Monocyte # : 1.03 K/uL  Auto Eosinophil # : 0.35 K/uL  Auto Basophil # : 0.19 K/uL  Auto Neutrophil % : 44.2 %  Auto Lymphocyte % : 32.8 %  Auto Monocyte % : 14.9 %  Auto Eosinophil % : 5.1 %  Auto Basophil % : 2.7 %      08    142  |  111<H>  |  13  ----------------------------<  88  4.5   |  28  |  0.88    Ca    8.7      08 May 2021 17:14  Mg     2.4     -    TPro  7.3  /  Alb  3.5  /  TBili  0.5  /  DBili  x   /  AST  29  /  ALT  21  /  AlkPhos  101  05-08      LIVER FUNCTIONS - ( 08 May 2021 17:14 )  Alb: 3.5 g/dL / Pro: 7.3 gm/dL / ALK PHOS: 101 U/L / ALT: 21 U/L / AST: 29 U/L / GGT: x                 CARDIAC MARKERS ( 08 May 2021 17:14 )  <0.015 ng/mL / x     / x     / x     / x            Urinalysis Basic - ( 08 May 2021 17:14 )    Color: Yellow / Appearance: Clear / S.015 / pH: x  Gluc: x / Ketone: Trace  / Bili: Negative / Urobili: Negative mg/dL   Blood: x / Protein: Negative mg/dL / Nitrite: Negative   Leuk Esterase: Trace / RBC: 0-2 /HPF / WBC 0-2   Sq Epi: x / Non Sq Epi: Occasional / Bacteria: Few            MEDICATIONS  (STANDING):  heparin   Injectable 5000 Unit(s) SubCutaneous every 12 hours  memantine 5 milliGRAM(s) Oral daily      
HPI: The patient is an 81 yo female with Hx. of Dementia, HTN, asthma, HLD , glaucoma who was brought to the ER from [facility] because  she was found wandering, confused, in the parking lot. The patient had a private aid  who takes care of her because of dementia but the aid did not sow up.    : pt still very confused; not able to make any meaningful conversation  on 1:1    5/10 : Patient is pleasantly confused. I have discussed with case management and will work on placement. I left message with contact in the chart.       PHYSICAL EXAM:    Daily Height in cm: 167.64 (08 May 2021 16:31)    Daily     ICU Vital Signs Last 24 Hrs  T(C): 36.8 (08 May 2021 23:54), Max: 36.9 (08 May 2021 23:34)  T(F): 98.3 (08 May 2021 23:54), Max: 98.4 (08 May 2021 23:34)  HR: 79 (08 May 2021 23:54) (65 - 79)  BP: 134/66 (08 May 2021 23:54) (134/66 - 142/85)  BP(mean): --  ABP: --  ABP(mean): --  RR: 17 (08 May 2021 23:54) (16 - 18)  SpO2: 94% (08 May 2021 23:54) (94% - 98%)      Constitutional: Well appearing  HEENT: Atraumatic, CANDIS, Normal, No congestion  Respiratory: Breath Sounds normal, no rhonchi/wheeze  Cardiovascular: N S1S2;   Gastrointestinal: Abdomen soft, non tender, Bowel Sounds present  Extremities: No edema, peripheral pulses present  Neurological: AAO x 0, no gross focal motor deficits  Skin: Non cellulitic, no rash, ulcers  Lymph Nodes: No lymphadenopathy noted  Back: No CVA tenderness   Musculoskeletal: non tender  Breasts: Deferred  Genitourinary: deferred  Rectal: Deferred                          13.3   6.92  )-----------( 254      ( 08 May 2021 17:14 )             40.6       CBC Full  -  ( 08 May 2021 17:14 )  WBC Count : 6.92 K/uL  RBC Count : 4.20 M/uL  Hemoglobin : 13.3 g/dL  Hematocrit : 40.6 %  Platelet Count - Automated : 254 K/uL  Mean Cell Volume : 96.7 fl  Mean Cell Hemoglobin : 31.7 pg  Mean Cell Hemoglobin Concentration : 32.8 gm/dL  Auto Neutrophil # : 3.06 K/uL  Auto Lymphocyte # : 2.27 K/uL  Auto Monocyte # : 1.03 K/uL  Auto Eosinophil # : 0.35 K/uL  Auto Basophil # : 0.19 K/uL  Auto Neutrophil % : 44.2 %  Auto Lymphocyte % : 32.8 %  Auto Monocyte % : 14.9 %  Auto Eosinophil % : 5.1 %  Auto Basophil % : 2.7 %          142  |  111<H>  |  13  ----------------------------<  88  4.5   |  28  |  0.88    Ca    8.7      08 May 2021 17:14  Mg     2.4         TPro  7.3  /  Alb  3.5  /  TBili  0.5  /  DBili  x   /  AST  29  /  ALT  21  /  AlkPhos  101  05-      LIVER FUNCTIONS - ( 08 May 2021 17:14 )  Alb: 3.5 g/dL / Pro: 7.3 gm/dL / ALK PHOS: 101 U/L / ALT: 21 U/L / AST: 29 U/L / GGT: x                 CARDIAC MARKERS ( 08 May 2021 17:14 )  <0.015 ng/mL / x     / x     / x     / x            Urinalysis Basic - ( 08 May 2021 17:14 )    Color: Yellow / Appearance: Clear / S.015 / pH: x  Gluc: x / Ketone: Trace  / Bili: Negative / Urobili: Negative mg/dL   Blood: x / Protein: Negative mg/dL / Nitrite: Negative   Leuk Esterase: Trace / RBC: 0-2 /HPF / WBC 0-2   Sq Epi: x / Non Sq Epi: Occasional / Bacteria: Few            MEDICATIONS  (STANDING):  heparin   Injectable 5000 Unit(s) SubCutaneous every 12 hours  memantine 5 milliGRAM(s) Oral daily      
Reason for Admission	AMS,  Hospital Course	  79 yo female with Hx. of Dementia, HTN, asthma, HLD , glaucoma who was brought to the ER from [facility] because  she was found wandering, confused, in the parking lot. The patient had a private aid  who takes care of her because of dementia but the aid did not sow up.    5/11: Lying in bed, confused but comfortable. No new events.  Awaiting discharge to long term care facility.    REVIEW OF SYSTEMS: All other review of systems is negative unless indicated above.    Vital Signs Last 24 Hrs  T(C): 36.4 (11 May 2021 16:14), Max: 36.4 (11 May 2021 16:14)  T(F): 97.5 (11 May 2021 16:14), Max: 97.5 (11 May 2021 16:14)  HR: 67 (11 May 2021 16:14) (67 - 67)  BP: 129/65 (11 May 2021 16:14) (129/65 - 129/65)  BP(mean): --  RR: 18 (11 May 2021 16:14) (18 - 18)  SpO2: 96% (11 May 2021 16:14) (96% - 96%)    PHYSICAL EXAM:    Constitutional: NAD, awake and alert, well-developed  HEENT: PERR, EOMI, Normal Hearing, MMM  Neck: Soft and supple  Respiratory: Breath sounds are clear bilaterally, No wheezing, rales or rhonchi  Cardiovascular: S1 and S2, regular rate and rhythm, no Murmurs, gallops or rubs  Gastrointestinal: Bowel Sounds present, soft, nontender, nondistended, no guarding, no rebound  Extremities: No peripheral edema  Neurological: A/O x 1, no focal deficits in my limited exam    MEDICATIONS  (STANDING):  heparin   Injectable 5000 Unit(s) SubCutaneous every 12 hours  memantine 5 milliGRAM(s) Oral daily    MEDICATIONS  (PRN):                CAPILLARY BLOOD GLUCOSE    A/P: 80 year old woman with progressive dementia.    Progressive dementia: Probably Alzheimer's.   - CT head : NEG  - CXR : clear  - UA : NEG, no infectious etiology  -c/w supportive care  -Patient during her stay was seen by psych and continued on her namenda     She will need long term placement .    Dispo:  -stable for DC pending placement

## 2021-05-12 NOTE — PROGRESS NOTE BEHAVIORAL HEALTH - REMOTE MEMORY
Impaired
Normal vision: sees adequately in most situations; can see medication labels, newsprint
Impaired

## 2021-05-12 NOTE — PROGRESS NOTE BEHAVIORAL HEALTH - NSBHCHARTREVIEWVS_PSY_A_CORE FT
Vital Signs Last 24 Hrs  T(C): 36.4 (11 May 2021 16:14), Max: 36.4 (11 May 2021 16:14)  T(F): 97.5 (11 May 2021 16:14), Max: 97.5 (11 May 2021 16:14)  HR: 67 (11 May 2021 16:14) (67 - 67)  BP: 129/65 (11 May 2021 16:14) (129/65 - 129/65)  BP(mean): --  RR: 18 (11 May 2021 16:14) (18 - 18)  SpO2: 96% (11 May 2021 16:14) (96% - 96%)
Vital Signs Last 24 Hrs  T(C): 36.4 (10 May 2021 07:32), Max: 36.8 (09 May 2021 21:04)  T(F): 97.6 (10 May 2021 07:32), Max: 98.3 (09 May 2021 21:04)  HR: 60 (10 May 2021 07:32) (60 - 73)  BP: 152/72 (10 May 2021 07:32) (147/93 - 154/76)  BP(mean): --  RR: 16 (10 May 2021 07:32) (16 - 17)  SpO2: 99% (10 May 2021 07:32) (97% - 100%)

## 2021-05-12 NOTE — PROGRESS NOTE BEHAVIORAL HEALTH - PRIMARY DX
Alzheimer's dementia, late onset, with behavioral disturbance
Alzheimer's dementia, late onset, with behavioral disturbance

## 2021-05-12 NOTE — PROGRESS NOTE BEHAVIORAL HEALTH - SUMMARY
80 year-old  female, living alone with health home aide, with no family in local area, with history of Dementia, with no other known psychiatric history, no known prior in-patient hospitalization or suicidal ideation or suicide attempt, presenting secondary to wandering in the setting of health home aid not showing up.  Patient is in good behavioral and impulse control. Patient likely to require placement secondary to progressive Dementia.  Collaterals: Kena Fitzgerald  [phone] called but [employer] appears to be a company. No other phone numbers are known for collateral.    PLAN  1. Continue Namenda 5 mg daily.  2.  pt needs supervision 24/7 or placement in dementia unit in [facility].
80 year-old  female, living alone with health home aide, with no family in local area, with history of Dementia, with no other known psychiatric history, no known prior in-patient hospitalization or suicidal ideation or suicide attempt, presenting secondary to wandering in the setting of health home aid not showing up.  Patient is in good behavioral and impulse control. Patient likely to require placement secondary to progressive Dementia.    PLAN  1. Continue Namenda 5 mg daily.  2.  pt needs supervision 24/7 or placement in dementia unit in [facility].

## 2021-05-12 NOTE — PROGRESS NOTE BEHAVIORAL HEALTH - NSBHCHARTREVIEWIMAGING_PSY_A_CORE FT
EXAM:  CT BRAIN                            PROCEDURE DATE:  05/08/2021          INTERPRETATION:  CLINICAL STATEMENT: Confusion    TECHNIQUE: CT of the head was performed without IV contrast.  RAPID artificial intelligence was utilized for the preliminary evaluation of intracranial hemorrhage.    COMPARISON: CT head 3/1/2020    FINDINGS:  There is mild diffuse parenchymal volume loss. There are areas of low attenuation in the periventricular white matter likely related to mild chronic microvascular ischemic changes.    There is no acute intracranial hemorrhage, parenchymal mass, mass effect or midline shift. There is no acute territorial infarct. There is no hydrocephalus.    The cranium is intact. 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    Mild mucosal thickening paranasal sinuses.    IMPRESSION:  No acute intracranial hemorrhage or acute territorial infarct.  If symptoms persist, follow-up MRI exam recommended.
EXAM:  CT BRAIN                            PROCEDURE DATE:  05/08/2021          INTERPRETATION:  CLINICAL STATEMENT: Confusion    TECHNIQUE: CT of the head was performed without IV contrast.  RAPID artificial intelligence was utilized for the preliminary evaluation of intracranial hemorrhage.    COMPARISON: CT head 3/1/2020    FINDINGS:  There is mild diffuse parenchymal volume loss. There are areas of low attenuation in the periventricular white matter likely related to mild chronic microvascular ischemic changes.    There is no acute intracranial hemorrhage, parenchymal mass, mass effect or midline shift. There is no acute territorial infarct. There is no hydrocephalus.    The cranium is intact. 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    Mild mucosal thickening paranasal sinuses.    IMPRESSION:  No acute intracranial hemorrhage or acute territorial infarct.  If symptoms persist, follow-up MRI exam recommended.

## 2021-05-12 NOTE — PROGRESS NOTE BEHAVIORAL HEALTH - RISK ASSESSMENT
LOW acute AND LONG TERM risk: pt has advanced dementia with executive cognitive problems, NO known psych hx.   PT is at increased risk for behavioral disturbances as part of her dementia, and in that sense at risk to harming self and others.   Mitigation of risks: pt needs supervision 24/7 or placement in dementia unit in [facility] [place].
LOW acute AND LONG TERM risk: pt has advanced dementia with executive cognitive problems, NO known psych hx.   PT is at increased risk for behavioral disturbances as part of her dementia, and in that sense at risk to harming self and others.   Mitigation of risks: pt needs supervision 24/7 or placement in dementia unit in [facility] [place].

## 2021-05-12 NOTE — PROGRESS NOTE BEHAVIORAL HEALTH - NSBHFUPINTERVALHXFT_PSY_A_CORE
No interval changes. PT is dressed in hospital gown, on 1:1 for wandering behavior.   She continues to be pleasant and talkative.    No suicidal or homicidal statement or behavior.   Pt states "I am 7o years old". She also states her daughter comes to visit, then she said she si too far to visit, can not tel the name or age.
PT is dressed in hospital gown, on 1:1 , ambulating, steady gait.   She appears bright and talkative. She has wandering behavior and needs 24/ supervision.   No suicidal or homicidal statement or behavior.   Pt states "I am 7 years old and my parents ".

## 2021-05-14 LAB
CULTURE RESULTS: SIGNIFICANT CHANGE UP
SPECIMEN SOURCE: SIGNIFICANT CHANGE UP

## 2021-05-19 DIAGNOSIS — F02.80 DEMENTIA IN OTHER DISEASES CLASSIFIED ELSEWHERE, UNSPECIFIED SEVERITY, WITHOUT BEHAVIORAL DISTURBANCE, PSYCHOTIC DISTURBANCE, MOOD DISTURBANCE, AND ANXIETY: ICD-10-CM

## 2021-05-19 DIAGNOSIS — E78.5 HYPERLIPIDEMIA, UNSPECIFIED: ICD-10-CM

## 2021-05-19 DIAGNOSIS — I10 ESSENTIAL (PRIMARY) HYPERTENSION: ICD-10-CM

## 2021-05-19 DIAGNOSIS — Z20.822 CONTACT WITH AND (SUSPECTED) EXPOSURE TO COVID-19: ICD-10-CM

## 2021-05-19 DIAGNOSIS — H40.9 UNSPECIFIED GLAUCOMA: ICD-10-CM

## 2021-05-19 DIAGNOSIS — J45.909 UNSPECIFIED ASTHMA, UNCOMPLICATED: ICD-10-CM

## 2021-05-19 DIAGNOSIS — F03.90 UNSPECIFIED DEMENTIA WITHOUT BEHAVIORAL DISTURBANCE: ICD-10-CM

## 2021-05-19 DIAGNOSIS — G30.1 ALZHEIMER'S DISEASE WITH LATE ONSET: ICD-10-CM

## 2022-08-12 NOTE — ED PROVIDER NOTE - CHIEF COMPLAINT
Follow up for care management.  LVM message with contact information and requested call back Progress Note      Subjective    Pt was initially cleared for home per Neuro, however RN reported continuous tremors, requiring ativan. 24hr EEG ordered.     Objective    Upon exam no tremors noted.     I/O's  No intake or output data in the 24 hours ending 07/14/21 1556    Last Recorded Vitals  Blood pressure 128/85, pulse 82, temperature 97.9 °F (36.6 °C), temperature source Oral, resp. rate 16, height 5' 7\" (1.702 m), weight 119.3 kg (263 lb), SpO2 95 %.  Body mass index is 41.19 kg/m².    Physical Exam  Constitutional:       Appearance: Normal appearance. She is obese.   Cardiovascular:      Rate and Rhythm: Normal rate and regular rhythm.   Pulmonary:      Effort: Pulmonary effort is normal.      Breath sounds: Normal breath sounds.   Abdominal:      General: Abdomen is flat.      Palpations: Abdomen is soft.   Skin:     General: Skin is warm and dry.   Neurological:      General: No focal deficit present.      Mental Status: She is alert and oriented to person, place, and time.         Labs   Admission on 07/12/2021   Component Date Value   • Extra Tube 07/12/2021 Hold for Add Ons    • Extra Tube 07/12/2021 Hold for Add Ons    • Extra Tube 07/12/2021 Hold for Add Ons    • Extra Tube 07/12/2021 Hold for Add Ons    • Extra Tube 07/12/2021 Hold for Add Ons    • Extra Tube 07/12/2021 Hold for Add Ons    • Extra Tube 07/12/2021 Hold for Add Ons    • Extra Tube 07/12/2021 Hold for Add Ons    • Lactate, Venous 07/12/2021 1.6    • Sodium 07/12/2021 138    • Potassium 07/12/2021 4.1    • Chloride 07/12/2021 104    • Carbon Dioxide 07/12/2021 26    • Anion Gap 07/12/2021 12    • Glucose 07/12/2021 89    • BUN 07/12/2021 12    • Creatinine 07/12/2021 0.94    • Glomerular Filtration Ra* 07/12/2021 68*   • BUN/ Creatinine Ratio 07/12/2021 13    • Calcium 07/12/2021 9.3    • Bilirubin, Total 07/12/2021 0.4    • GOT/AST 07/12/2021 20    • GPT/ALT 07/12/2021 25    • Alkaline Phosphatase 07/12/2021 102    • Albumin 07/12/2021  The patient is a 76y Female complaining of fall. 3.6    • Protein, Total 07/12/2021 7.2    • Globulin 07/12/2021 3.6    • A/G Ratio 07/12/2021 1.0    • CK 07/12/2021 114    • Magnesium 07/12/2021 2.3    • COLOR, URINALYSIS 07/12/2021 Yellow    • APPEARANCE, URINALYSIS 07/12/2021 Clear    • GLUCOSE, URINALYSIS 07/12/2021 Negative    • BILIRUBIN, URINALYSIS 07/12/2021 Negative    • KETONES, URINALYSIS 07/12/2021 Negative    • SPECIFIC GRAVITY, URINAL* 07/12/2021 1.010    • OCCULT BLOOD, URINALYSIS 07/12/2021 Negative    • PH, URINALYSIS 07/12/2021 6.0    • PROTEIN, URINALYSIS 07/12/2021 Negative    • UROBILINOGEN, URINALYSIS 07/12/2021 0.2    • NITRITE, URINALYSIS 07/12/2021 Negative    • LEUKOCYTE ESTERASE, URIN* 07/12/2021 Negative    • Ventricular Rate EKG/Min* 07/12/2021 179    • Atrial Rate (BPM) 07/12/2021 136    • QRS-Interval (MSEC) 07/12/2021 62    • QT-Interval (MSEC) 07/12/2021 204    • QTc 07/12/2021 352    • R Axis (Degrees) 07/12/2021 1    • T Axis (Degrees) 07/12/2021 -23    • REPORT TEXT 07/12/2021                      Value:Baseline artifact  Sinus rhythm  Septal infarct  , age undetermined  repeat study  Abnormal ECG  No previous ECGs available  Confirmed by LANCE VELASCO MD ([id number]) on 7/13/2021 11:05:44 AM     • WBC 07/12/2021 7.4    • RBC 07/12/2021 4.00    • HGB 07/12/2021 13.3    • HCT 07/12/2021 41.1    • MCV 07/12/2021 102.8*   • MCH 07/12/2021 33.3    • MCHC 07/12/2021 32.4    • RDW-CV 07/12/2021 14.3    • RDW-SD 07/12/2021 53.7*   • PLT 07/12/2021 239    • NRBC 07/12/2021 0    • Neutrophil, Percent 07/12/2021 68    • Lymphocytes, Percent 07/12/2021 16    • Mono, Percent 07/12/2021 10    • Eosinophils, Percent 07/12/2021 4    • Basophils, Percent 07/12/2021 1    • Immature Granulocytes 07/12/2021 1    • Absolute Neutrophils 07/12/2021 5.1    • Absolute Lymphocytes 07/12/2021 1.2    • Absolute Monocytes 07/12/2021 0.7    • Absolute Eosinophils  07/12/2021 0.3    • Absolute Basophils 07/12/2021 0.0    • Absolute Immmature Granu* 07/12/2021 0.1     • Rapid SARS-COV-2 by PCR 07/12/2021 Not Detected    • Isolation Guidelines 07/12/2021     • Procedural Comment 07/12/2021     • TSH 07/13/2021 0.449    • Sodium 07/13/2021 140    • Potassium 07/13/2021 3.8    • Chloride 07/13/2021 106    • Carbon Dioxide 07/13/2021 29    • Anion Gap 07/13/2021 9*   • Glucose 07/13/2021 100*   • BUN 07/13/2021 11    • Creatinine 07/13/2021 0.86    • Glomerular Filtration Ra* 07/13/2021 76*   • BUN/ Creatinine Ratio 07/13/2021 13    • Calcium 07/13/2021 8.7    • WBC 07/13/2021 4.8    • RBC 07/13/2021 3.56*   • HGB 07/13/2021 11.8*   • HCT 07/13/2021 36.7    • MCV 07/13/2021 103.1*   • MCH 07/13/2021 33.1    • MCHC 07/13/2021 32.2    • RDW-CV 07/13/2021 14.5    • RDW-SD 07/13/2021 54.3*   • PLT 07/13/2021 198    • NRBC 07/13/2021 0    • Neutrophil, Percent 07/13/2021 70    • Lymphocytes, Percent 07/13/2021 17    • Mono, Percent 07/13/2021 8    • Eosinophils, Percent 07/13/2021 4    • Basophils, Percent 07/13/2021 0    • Immature Granulocytes 07/13/2021 1    • Absolute Neutrophils 07/13/2021 3.3    • Absolute Lymphocytes 07/13/2021 0.8*   • Absolute Monocytes 07/13/2021 0.4    • Absolute Eosinophils  07/13/2021 0.2    • Absolute Basophils 07/13/2021 0.0    • Absolute Immmature Granu* 07/13/2021 0.0    • GLUCOSE, BEDSIDE - POINT* 07/12/2021 98    • Vitamin B12 07/13/2021 1,191*   • Folate 07/13/2021 6.1    • Sodium 07/14/2021 140    • Potassium 07/14/2021 4.2    • Chloride 07/14/2021 106    • Carbon Dioxide 07/14/2021 32    • Anion Gap 07/14/2021 6*   • Glucose 07/14/2021 103*   • BUN 07/14/2021 11    • Creatinine 07/14/2021 0.92    • Glomerular Filtration Ra* 07/14/2021 70*   • BUN/ Creatinine Ratio 07/14/2021 12    • Calcium 07/14/2021 8.8    • WBC 07/14/2021 5.2    • RBC 07/14/2021 3.73*   • HGB 07/14/2021 12.3    • HCT 07/14/2021 38.8    • MCV 07/14/2021 104.0*   • MCH 07/14/2021 33.0    • MCHC 07/14/2021 31.7*   • PLT 07/14/2021 202    • RDW-CV 07/14/2021 14.4    • RDW-SD  07/14/2021 55.0*   • [facility] 07/14/2021 0          Imaging  LAST ECHO/ECHO STRESS:  No valid procedures specified.    LAST MRI:  === 07/12/21 ===    MRI BRAIN W WO CONTRAST    - Narrative -  EXAMINATION: Magnetic resonance imaging (MRI) of the brain without and with  contrast    HISTORY: Altered mental status    TECHNIQUE: Multiplanar multi-weighted MRI of the brain and brainstem was  performed without and with intravenous contrast using the general brain  protocol.    Contrast information: 10 mL Gadavist    COMPARISON: Comparison is made with a head CT from 7/12/2021 and brain MRI  from 5/12/2019.    FINDINGS:    Images are degraded by motion. There is no evidence of acute cerebral  infarction. No acute or chronic hemorrhage is identified. There is mild  cerebral volume loss. The ventricles are normal in size and position  without evidence of hydrocephalus. There are no areas of abnormal contrast  enhancement.    The scalp and calvarium are normal. The superior sagittal sinus  demonstrates normal venous flow. The corpus callosum is normal in shape and  signal intensity. The posterior fossa is unremarkable. The pituitary and  sella are normal. The brainstem and craniocervical junction are  unremarkable.    Other than a right maxillary mucous retention cyst and bilateral mastoid  effusions, the, the visualized portions of the orbits, paranasal sinuses,  and mastoids appear normal. Normal flow voids are demonstrated in the  carotid arteries and basilar artery.    - Impression -  No acute brain abnormality.    Electronically Signed by: MAGALY HOFFMAN MD  Signed on: 7/13/2021 7:13 AM      === 05/13/19 ===    MRI ADVOCATE PROCEDURE    - Narrative -  Accession #    [id number]    EXAMINATION:  1. Magnetic resonance imaging (MRI) of the brain without and with contrast  2. Magnetic resonance angiography (MRA) of the head without contrast  3. MRA of the neck without and with contrast    HISTORY:  Seizure    TECHNIQUE: MRI of the brain was performed prior to and following the uneventful administration  of 20 mL intravenous gadolinium contrast according to standard protocol. MRA of the head was  performed without contrast utilizing time of flight technique. MRA of the neck was performed  utilizing fluoroscopy triggered contrast enhanced technique after the uneventful administration  of 20 mL Gadavist intravenous gadolinium contrast.    COMPARISON: CT brain from 03/09/2019    Brain:    No evidence of acute or chronic hemorrhage is identified. No evidence of acute cerebral  infarction is seen. The ventricles are of normal size, shape, and morphology. No mass effect or  midline shift is seen. No enhancing lesions are identified. The corpus callosum and sella appear  normal. The posterior fossa, brainstem, and craniocervical junction appear normal.    The visualized portions of the orbits, paranasal sinuses, and mastoids appear normal. Normal flow  voids are demonstrated in the carotid arteries and basilar artery. The calvarium and visualized  cervical spine appear normal.    Angiographic findings:    The visualized aortic arch appears normal. The configuration of the brachiocephalic vessels is  typical. The innominate artery and both subclavian arteries appear normal. The common carotid  arteries appear normal. The carotid bifurcations appear normal. The cervical internal carotid  arteries appear normal. The cervical vertebral arteries appear normal.    The distal internal carotid arteries appear normal. The anterior and middle cerebral arteries  appear normal. The distal vertebral arteries appear normal. The basilar artery and posterior  cerebral arteries appear normal. No aneurysms, vascular occlusions, or intracranial stenoses are  identified.    - Impression -  1. No acute intracranial abnormality.    2. No flow-limiting stenosis within the intracranial or cervical vasculature.  -----  F I N A L   -----    Transcribed By: BONNIE  05/13/19 4:44 am    Dictated By:            SARITA, TOSHIA NATH MD    Electronically Reviewed and Approved By:           SARITA, TOSHIA NATH MD  05/13/19 4:46 am    ___________________________________________________________________________    MRI ADVOCATE PROCEDURE    - Narrative -  Accession #    [id number]    EXAMINATION:  1. Magnetic resonance imaging (MRI) of the brain without and with contrast  2. Magnetic resonance angiography (MRA) of the head without contrast  3. MRA of the neck without and with contrast    HISTORY: Seizure    TECHNIQUE: MRI of the brain was performed prior to and following the uneventful administration  of 20 mL intravenous gadolinium contrast according to standard protocol. MRA of the head was  performed without contrast utilizing time of flight technique. MRA of the neck was performed  utilizing fluoroscopy triggered contrast enhanced technique after the uneventful administration  of 20 mL Gadavist intravenous gadolinium contrast.    COMPARISON: CT brain from 03/09/2019    Brain:    No evidence of acute or chronic hemorrhage is identified. No evidence of acute cerebral  infarction is seen. The ventricles are of normal size, shape, and morphology. No mass effect or  midline shift is seen. No enhancing lesions are identified. The corpus callosum and sella appear  normal. The posterior fossa, brainstem, and craniocervical junction appear normal.    The visualized portions of the orbits, paranasal sinuses, and mastoids appear normal. Normal flow  voids are demonstrated in the carotid arteries and basilar artery. The calvarium and visualized  cervical spine appear normal.    Angiographic findings:    The visualized aortic arch appears normal. The configuration of the brachiocephalic vessels is  typical. The innominate artery and both subclavian arteries appear normal. The common carotid  arteries appear normal. The carotid  bifurcations appear normal. The cervical internal carotid  arteries appear normal. The cervical vertebral arteries appear normal.    The distal internal carotid arteries appear normal. The anterior and middle cerebral arteries  appear normal. The distal vertebral arteries appear normal. The basilar artery and posterior  cerebral arteries appear normal. No aneurysms, vascular occlusions, or intracranial stenoses are  identified.    - Impression -  1. No acute intracranial abnormality.    2. No flow-limiting stenosis within the intracranial or cervical vasculature.  -----  F I N A L  -----    Transcribed By: BONNIE  05/13/19 4:44 am    Dictated By:            SARITA, TOSHIA NATH MD    Electronically Reviewed and Approved By:           TOSHIA STEIN MD  05/13/19 4:46 am    LAST CT:  === 07/12/21 ===    CT HEAD WO CONTRAST    - Narrative -  HISTORY:Seizure, nontraumatic (Age >= 41y). 350 lb    TECHNIQUE:  CT of the head is obtained without contrast. Coronal reformation is  generated.    COMPARISONS: June 21, 2021.    RESULTS:    There is no acute intracranial hemorrhage.  There is no mass effect.  There is no midline shift.  The ventricles are normal in size and position.  The gray-white differentiation is maintained.  Basal cisterns are preserved.  no mass is evident.  The intracranial vascular structures are otherwise normal.    The extra cranial soft tissues are otherwise normal.  Calvarium is unremarkable.  The skull base is normal.  The paranasal sinuses are clear.  The mastoids and middle ears are clear.    - Impression -  No acute intracranial process.            Electronically Signed by: VINCENT FINNEY MD  Signed on: 7/12/2021 6:33 PM      === 06/21/21 ===    CT HEAD WO CONTRAST    - Narrative -  EXAM: CT HEAD WO CONTRAST      INDICATION: Seizure; recent fall and head injury.    FINDINGS:    Multiple axial sections with sagittal and coronal 2-D reconstructions  obtained, as requested, without the  administration of intravenous contrast  material, demonstrate slight to moderate prominence of the cortical sulci,  ventricular system and paraventricular white matter, consistent with  involutional changes, which appears similar to the findings observed on the  previous examination of 03/09/2019.    There is no appreciable mass effect, extracerebral fluid collection or  evidence of acute intracranial hemorrhage or infarct.    Incidentally noted is a partially \"empty\" sella, hyperostosis frontalis  interna, deviation of the nasal septum, focal mucosal thickening versus  retention cysts or polypoid lesions of other etiology involving the  bilateral maxillary sinuses and apparent fluid within the left mastoid air  cells.    - Impression -  1.  Slight to moderate involutional changes, unchanged.    2.  Partially \"empty\" sella.    3.  Hyperostosis frontalis interna.    4.  Paranasal sinus disease, as described.    5.  Apparent fluid, left mastoid air cells.    Electronically Signed by: TIMOTHY ARELLANO MD  Signed on: 6/22/2021 6:36 AM      === 01/06/21 ===    CT CHEST HIGH RESOLUTION WO CONTRAST    - Narrative -  DICTATING PHYSICIAN:  Jonathan Mcallister MD    EXAM DATE:    1/12/2021 9:04 AM    EXAM:   CT CHEST HIGH RESOLUTION WO CONTRAST    INDICATION: Sjogren's syndrome, interstitial disease.    COMPARISON: 07/05/2018, 06/13/2018    PROCEDURE: High resolution CT of the chest obtained from the thoracic inlet  to the upper abdomen in inspiratory and expiratory phases, and in supine  and prone positions without the administration of IV contrast. Dose  reduction technique(s) utilized.    FINDINGS:    Lung and Airways: No focal consolidation. No suspicious pulmonary nodules  or masses. Dense irregular subpleural parenchymal density at the dependent  right lung base measuring approximately 5.0 x 1.5 cm (series 5 image 11)  and a second focus measuring 0.4 x 0.8 cm (series 5 image 8), which has  progressed from 2018.  This  persists on prone imaging.  Otherwise, no  significant interstitial disease.  No honeycombing.  Pleura: No effusion or thickening.  Mediastinum and Stacy: No adenopathy or mass.  Heart: Normal size. No pericardial effusion. Mild coronary artery  atherosclerosis.  Vessels: No aortic aneurysm. Main pulmonary artery is normal in caliber.  Chest Wall and Axilla: Normal.  Upper Abdomen: No abnormality of the visualized structures.  Bones: No destructive lesions.  .    - Impression -  Irregular subpleural parenchymal densities at the right lung base.  Findings are nonspecific but could represent infection, postinflammatory  changes/scarring, organizing pneumonia, etc.  Some of this was present in  2018, but the largest involved region is new.  Otherwise, no evidence of  significant interstitial lung disease.  No honeycombing or significant  fibrosis.    Electronically Signed by: SHIRLEY PALACIOS MD  Signed on: 1/12/2021 12:34 PM    LAST EKG:  Encounter Date: 07/12/21   Electrocardiogram 12-Lead   Result Value    Ventricular Rate EKG/Min (BPM) 179    Atrial Rate (BPM) 136    QRS-Interval (MSEC) 62    QT-Interval (MSEC) 204    QTc 352    R Axis (Degrees) 1    T Axis (Degrees) -23    REPORT TEXT      Baseline artifact  Sinus rhythm  Septal infarct  , age undetermined  repeat study  Abnormal ECG  No previous ECGs available  Confirmed by LANCE VELASCO MD ([id number]) on 7/13/2021 11:05:44 AM         LAST X-RAY:  === 07/12/21 ===    XR CHEST PA OR AP 1 VIEW    - Narrative -  EXAM:  XR CHEST PA OR AP 1 VIEW    CLINICAL INDICATION: Tachycardia    COMPARISON: 09/17/2020    FINDINGS: Lungs are clear.  No pneumothorax.  No pleural effusion.  Heart  size and mediastinal contours are normal.  No acute osseous abnormality.    - Impression -  No acute cardiopulmonary abnormality. Normal chest radiograph.    Electronically Signed by: KOTA FUNES M.D.  Signed on: 7/12/2021 4:33 PM      === 05/14/19 ===    XR ADVOCATE PROCEDURE    - Narrative  -  Accession #    [id number]    EXAM: XR SHOULDER LT 2V    CLINICAL INDICATION: Pain    COMPARISON: None.    - Impression -  FINDINGS/IMPRESSION:    No gross fracture or dislocation.    Mild degenerative changes of glenohumeral joints noted.    Moderate degenerative changes of acromioclavicular joint noted.    -----  F I N A L  -----    Transcribed By: BONNIE  05/14/19 4:35 pm    Dictated By:            ABDIAZIZ GARZA MD    Electronically Reviewed and Approved By:           ABDIAZIZ GARZA MD  05/14/19 4:36 pm      === 06/13/18 ===    XR CHEST 2V    - Narrative -  Accession #    [id number]    EXAM: XR CHEST 2V    CLINICAL INDICATION: Difficulty breathing.    COMPARISON: Chest x-ray 02/11/2017.    FINDINGS:    Study is limited due to underpenetration secondary to the patient's body habitus.    The Cardiomediastinal silhouette is of normal size and configuration.  There is no evidence of  pulmonary vasculature congestion.    The lungs are well-aerated.  There is blunting of the left posterior costophrenic angle  suggesting a small left-sided pleural effusion.  No consolidation,  or pneumothorax identified.    The regional bones are intact.    IMPRESSION    1.   Blunting of the left posterior costophrenic angle suggesting a small left-sided pleural  effusion.    F I N A L      Transcribed By: BONNIE  06/13/18 7:50 pm    Dictated By:            SENIA ELMORE MD    Electronically Reviewed and Approved By:           SENIA ELMORE MD  06/13/18 7:52 pm    LAST U/S:  No results found for this or any previous visit.      Assessment & Plan     BUE Tremors in pt with Hx of Seizure Disorder  Pt presented with arm shaking that started 1.5 hrs PTA. Pt reports hx of grand mal seizures, reports symptoms are not consistent with her usual seizure activity. Reports last seizure was approx 3-4 weeks ago  Received Ativan 2.5mg in ED  (7/12) CTH- No acute findings  (7/13) MRI brain- No acute brain abnormality.  (7/13)  EEG Negative per Neuro             -Lamictal 250mg q12             -Ativan PRN   -24 hr EEG             -Seizure precautions              -Neuro following: Serotonin syndrome was considered but extremely unlikely, she did not have most of the symptoms for that syndrome.     Low grade fever of unclear etiology (resolved)  Tmax 38.0 in ED  COVID Negative  CXR Negative  UA Negative  WBC WNL             -Afebrile today, Monitor      Macrocytic Anemia             -hgb 11.8> WNL (7/14), Trend     Sleep apnea             -Satting well rm air     Fibromyalgia- Gabapentin 100mg TID  Sjogren's Syndrome- New acute issues, on no home meds  DepressionAnxiety- Celebrex 100mg and Clonazepam PRN, hold fluoxetine for now until cleared to resume by Neurology  Hypothyroidism- Levothyroxine 150mcg daily   GERD- protonix 40mg BID  Morbid Obesity BMI 41 -discuss lifestyle modifications           Code Status: Full Code     DVT prophylaxis: Heparin subq     Disposition: Continue to medically manage along with Neuro. 24 hr EEG per Neuro.      PCP: Albert Christine CNP  7/14/2021

## 2023-06-26 NOTE — ED STATDOCS - PATIENT PORTAL LINK FT
Addended by: GERALD SIMS on: 6/26/2023 09:05 AM     Modules accepted: Orders     You can access the FollowMyHealth Patient Portal offered by [facility] by registering at the following website: [email or web address]. By joining [employer]’s FollowMyHealth portal, you will also be able to view your health information using other applications (apps) compatible with our system.

## 2024-06-11 NOTE — ED ADULT NURSE NOTE - CAS DISCH TRANSFER METHOD
Radha Alan, is a 46 year old female who presents today for establishment of care.    The recording was initiated after a verbal consent was obtained from the patient to record this visit for documentation in their clinical record.    Historian: Self.    She hasn't had a PCP for five years.    Iron deficiency anemia due to chronic blood loss: She has anemia as she has heavy menstrual bleeding, hemoglobin was low at 9.9 g/dL.  Her ferritin was low in the past. She used to take iron supplements in the past. She was not given any medication for bleeding by her OB/GYN. She had her period this Thursday and today is having heavy bleeding, sometimes hemorrhages. Her periods last from 5 to 16 days depending on the severity of bleeding. She does not feel weak, lousy and low energy. She had a pelvic ultrasound done on Friday, and she is going to follow up with OB/GYN.     Type 2 diabetes mellitus without complication, without long-term current use of insulin (CMD); Uncontrolled type 2 diabetes mellitus with hyperglycemia (CMD): Her A1c is at 11.4%. Five years ago her A1c was at 6.6%. She was never on any medication for her diabetes. Her blood sugar was 270 mg/dL. She is not sure if she tolerates Metformin, she was on it previously. She drinks a gallon of water a day, and she does not urinate a lot. She has burning and tingling of the feet sometimes. No open areas or ulcers. She does not monitor her blood sugars at home.     Diabetic eye exam (CMD): She just got a bifocal. She just had an eye exam for her glasses, they did not dilate her eyes.     Additional comments:  Labs: She had her hormones checked and FSH was at the perimenopausal level. Kidney function was normal. Alkaline phosphatase is a little high. Albumin was a little low. Sodium was low.    Sleep: She does not sleep well at night. She falls asleep in 45 minutes at night at around 9-9: 30 pm and wakes up 2-3 times at night to urinate and sometimes cannot go back  to sleep, last week was rough for her, she used to wake up at 2 a.m and could not go back to sleep, fell asleep at 4 a.m and missed her alarm. She snores at night. She drinks 2-3 cups of coffee in the morning.    High cholesterol: Her cholesterol is 200 mg/dL; HDL was high; LDL was 121 mg/dL. She consumes a lot of carbs. She noticed that she doesn't feel good after eating potatoes and is trying to stay away from them.    Essential hypertension: she was told that her blood pressure was always high. It is elevated today as well, on recheck it was 160/100 mmHg. States that she does not consume much salt. She was given Lisinopril but she stopped taking that. She was upset that two of her friends have passed away in the last two weeks. One of them  from a heart attack due to being overweight.     Colon cancer screening: She had a cologuard and that was negative.     Cervical cancer screening: She had a PAP test on 24 and that was satisfactory and negative for malignancy. She took a sample, it was benign and negative for cancer. She was negative for HPV.     Breast cancer screening: She had a mammogram.    Immunizations: Due for COVID-19 and influenza vaccines.    Current Outpatient Medications   Medication Sig    metFORMIN (GLUCOPHAGE) 500 MG tablet Take 1 tablet by mouth in the morning and 1 tablet in the evening. Take with meals.    rosuvastatin (CRESTOR) 10 MG tablet Take 1 tablet by mouth daily.    valsartan (DIOVAN) 80 MG tablet Take 1 tablet by mouth in the morning and 1 tablet in the evening.     No current facility-administered medications for this visit.       ALLERGIES:  No Known Allergies    Patient Active Problem List   Diagnosis    SEBACEOUS CYSTS-scalp    Type 2 diabetes mellitus without complication, without long-term current use of insulin  (CMD)    Essential hypertension    Iron deficiency anemia due to chronic blood loss    Excessive or frequent menstruation       REVIEW OF  SYSTEMS  Cardiovascular: Denies chest pain, edema.  Gastrointestinal: Normal bowel movements.  Genitourinary: Denies any burning or urination. Has increased urinary frequency at night.  Neurological: Positive for burning and tingling of feet occasionally.  Psychiatric: Positive for sleep issues.    PHYSICAL EXAM  Vital Signs:  Visit Vitals  BP (!) 148/90 (BP Location: RUE - Right upper extremity, Patient Position: Sitting)   Pulse 73   Ht 5' 3\"   Wt 116.8 kg (257 lb 8 oz)   LMP 06/09/2024 (Approximate)   SpO2 95%   BMI 45.61 kg/m²   Constitutional: In no acute distress. Well-developed   Eyes: The conjunctiva exhibited no abnormalities. The sclera was normal   Pulmonary: no respiratory distress, normal respiratory rate and effort and no accessory muscle use. Breath sounds clear to auscultation bilaterally.   Cardiovascular: normal rate, no murmurs were heard, regular rhythm, normal S1 and normal S2. Edema was not present in the lower extremities.   Skin: Skin moisture and turgor normal.  Psychiatric: Oriented to time, place, and person. The mood was normal. The effect was normal. The memory was unimpaired.       ASSESSMENT AND PLAN    Iron deficiency anemia due to chronic blood loss:   - Reviewed and discussed labs.  - She is anemic.  - We will check her iron levels.  - Ordered Iron And total Iron Binding Capacity; Future  - Ordered Ferritin; Future  - She may need to start on an iron supplement.  - Explained that once her sugar levels drop, she will have much energy to be active.- If her iron level is low, initially we will give her iron supplements.  - Explained that we may refer her to a hematologist, if her iron levels were too low and if she needs to have iron infusions every 2 months.  - Discussed that her body got used to the low levels of iron and hence she is asymptomatic.    Type 2 diabetes mellitus without complication, without long-term current use of insulin  (CMD);Uncontrolled type 2 diabetes mellitus  with hyperglycemia (CMD):  - Her A1c was elevated.   - Explained that her average blood sugar levels are 250-260 given her A1c.   - Discussed the goal that blood glucose levels should not be over 200 mg/dL. It should be in the 110-150 mg/dL range.  - Explained that sodium was low due to high blood glucose.  - Explained that Metformin is an excellent and safest drug for diabetes. Discussed its course and side effects in detail.  - We will put her on Metformin, maximize the dose and then add another drug like Wegovy or Ozempic to assist with weight loss(30-50 pounds). These weight loss medications do not make her feel hungry, they help in lowering blood glucose, cholesterol, blood pressure and weight.   - She should monitor her blood sugars at home and will call and update if they start to elevate even after taking the Metformin. We can adjust the dose.  - Diabetes can make her more thirsty. Informed that excessive water intake can sometimes lead to puffy legs in some women.  - She will take her Metformin, Rosuvastatin and Valsartan half an hour before breakfast in the morning at a time and the Metformin again before supper.  - Discussed bariatric surgery in detail and discussed that if the weight loss medication does not work, she can consider this in the future.  - Ordered Glycohemoglobin; Future.   - Ordered Lipid Panel Without Reflex; Future  - Ordered SGPT; Future  - Prescribed metFORMIN (GLUCOPHAGE) 500 MG tablet Take 1 tablet by mouth in the morning and 1 tablet in the evening. Take with meals. Eprescribe, Disp-60 tablet, R-1  We will check her cholesterol and A1c in three months.  - Referred SERVICE TO DIABETES EDUCATION. They will educate her on how to read the labels and how to count carbs.    Diabetic eye exam  (CMD):  - Explained that uncontrolled diabetes can cause vision complications like blurring.  - Recommended having a diabetic eye exam.  - Referred SERVICE TO OPTOMETRY     Additional plan:    Sleep:    - Lack of sleep can be due to sleep apnea.  - We will check her for sleep apnea in the future, if she qualifies for a sleep study.  - Explained in detail, the different masks that are available for CPAP.   - Explained in detail the pathophysiology of sleep apnea in detail. Weight loss can improve her sleep as well.    High cholesterol:   - Explained that the goal LDL should be less than 100 mg/dL and closer to 70 mg/dL, given her diabetes.  - Explained that LDL clogs up arteries and causes heart attacks and strokes.   - Recommended starting on a statin, 10 mg given her cholesterol. Informed that they can cause liver issues and muscle pain. If she does not tolerate it we can stop, otherwise can continue.   - Prescribed rosuvastatin (CRESTOR) 10 MG tablet Take 1 tablet by mouth daily. Eprescribe, Disp-30 tablet, R-3  - Cut down on white rice, white bread, pasta, potatoes.    Essential hypertension:   - Recommended starting on medication again, like Lisinopril.  - Discussed that high blood glucose, high blood pressure and high cholesterol does not cause symptoms but damage the body internally and that medical intervention is necessary.   - Prescribed valsartan (DIOVAN) 80 MG tablet Take 1 tablet by mouth in the morning and 1 tablet in the evening. Eprescribe, Disp-30 tablet, R-5  - Recommended cutting back on salt intake.    Colon cancer screening:   - Reviewed his cologuard results, normal.  - Recommended repeating in three years.    Cervical cancer screening:   - Up-to-date.  - Reviewed her PAP results.    Breast cancer screening:   - Up-to-date.    Immunizations:   - Recommended influenza vaccine.    To have lab today  To see Dr Lilly in 3 months with fasting labs     Refer to orders.  Medical compliance with plan discussed and risks of non-compliance reviewed.  Patient education completed on disease process, etiology & prognosis.  Proper usage and side effects of medications reviewed & discussed.  Return to clinic  as clinically indicated as discussed with patient who verbalized understanding of the plan and is in agreement with the plan.    I,  Ray Don, have created a visit summary document based on the audio recording between Waqas Lilly MD and this patient for the physician to review, edit as needed, and authenticate.  Creation Date: 6/11/2024     The documentation recorded by the scribe accurately and completely reflects the service(s) I personally performed and the decisions made by me.      Private car

## 2024-07-25 NOTE — PATIENT PROFILE ADULT - NSPROGENPREVTRANSF_GEN_A_NUR
Disc bx vs ln2/topicals r/b/sed\\n-opted for latter: ln2 today and in 2 weeks, start efudex cr bid*2 weeks\\nrtc 2mo, w/c
Detail Level: Detailed
unable to assess, pt poor historian, does not answer questions appropriately

## 2024-07-30 NOTE — ED PROVIDER NOTE - EKG #1 DATE/TIME
08-Mar-2020 13:12 [FreeTextEntry1] : Pap done today.  Continue breast care with Laura.   Pt is not 100% sure if bleeding was truly in the urine or from the vagina. Prescription for a transvaginal sonogram given to reevaluate endometrial lining. If lining is thickened, I will recommend an EMB.   Self-breast exam reviewed.  F/u for pelvic sono or as needed.